# Patient Record
Sex: MALE | Race: WHITE | Employment: OTHER | URBAN - METROPOLITAN AREA
[De-identification: names, ages, dates, MRNs, and addresses within clinical notes are randomized per-mention and may not be internally consistent; named-entity substitution may affect disease eponyms.]

---

## 2018-08-02 ENCOUNTER — OFFICE VISIT (OUTPATIENT)
Dept: SURGERY | Age: 83
End: 2018-08-02

## 2018-08-02 ENCOUNTER — PROCEDURE VISIT (OUTPATIENT)
Dept: SURGERY | Age: 83
End: 2018-08-02

## 2018-08-02 VITALS
WEIGHT: 136 LBS | DIASTOLIC BLOOD PRESSURE: 66 MMHG | BODY MASS INDEX: 18.42 KG/M2 | HEIGHT: 72 IN | SYSTOLIC BLOOD PRESSURE: 138 MMHG

## 2018-08-02 DIAGNOSIS — I65.23 BILATERAL CAROTID ARTERY STENOSIS WITHOUT CEREBRAL INFARCTION: Primary | ICD-10-CM

## 2018-08-02 DIAGNOSIS — I70.212 ATHEROSCLEROSIS OF NATIVE ARTERY OF LEFT LOWER EXTREMITY WITH INTERMITTENT CLAUDICATION (HCC): ICD-10-CM

## 2018-08-02 DIAGNOSIS — E78.5 HYPERLIPIDEMIA, UNSPECIFIED HYPERLIPIDEMIA TYPE: ICD-10-CM

## 2018-08-02 DIAGNOSIS — I10 ESSENTIAL HYPERTENSION, BENIGN: ICD-10-CM

## 2018-08-02 PROCEDURE — 93925 LOWER EXTREMITY STUDY: CPT | Performed by: SURGERY

## 2018-08-02 PROCEDURE — 99214 OFFICE O/P EST MOD 30 MIN: CPT | Performed by: NURSE PRACTITIONER

## 2018-08-02 PROCEDURE — 93880 EXTRACRANIAL BILAT STUDY: CPT | Performed by: SURGERY

## 2018-08-02 NOTE — PROGRESS NOTES
Authorizing Provider   Multiple Vitamins-Minerals (PRESERVISION AREDS 2 PO) Take  by mouth 2 times daily. Yes Historical Provider, MD   ranitidine (ZANTAC) 300 MG tablet   Take 300 mg by mouth nightly  Yes Historical Provider, MD   PLAVIX 75 MG tablet daily. Yes Historical Provider, MD   aspirin 81 MG chewable tablet Take 81 mg by mouth daily. Yes Historical Provider, MD   hydrochlorothiazide (HYDRODIURIL) 25 MG tablet Take 25 mg by mouth daily. Yes Historical Provider, MD   lisinopril (PRINIVIL;ZESTRIL) 10 MG tablet Take 10 mg by mouth daily. Yes Historical Provider, MD   simvastatin (ZOCOR) 10 MG tablet Take 20 mg by mouth nightly. Yes Historical Provider, MD       History reviewed. Objective:   Physical Exam   Constitutional: He appears well-developed and well-nourished. thin   HENT:   Head: Normocephalic. Right Ear: Decreased hearing is noted. Left Ear: Decreased hearing is noted. Neck: Trachea normal. Neck supple. No JVD present. Carotid bruit is not present. No tracheal deviation present. Cardiovascular: Normal rate and normal heart sounds. An irregular rhythm present. Pulses:       Carotid pulses are 2+ on the right side, and 2+ on the left side. Radial pulses are 2+ on the right side, and 2+ on the left side. Femoral pulses are 2+ on the right side, and 2+ on the left side. Popliteal pulses are 2+ on the right side, and 1+ on the left side. Dorsalis pedis pulses are 0 on the right side, and 0 on the left side. Posterior tibial pulses are 2+ on the right side, and 0 on the left side. MONA'S:TODAY  Right:  P.T.: 160 D.P.: 162 ARM BP: 144/70 P. I.: 1.11/1.12  Left:  P.T.:102 D.P.: 100 ARM BP: 138/66 P. I.: 0.71/0.69      Patent right femoral PT in situ bypass graft    Ankle Brachial Index Report from 12/07/2017     Right  Left  Arm   144/70  139/74    Posterior Tibial  158    108  Dorsalis Pedis            144 60  MONA:   1.10/1.0 0.75/0.42     Pulmonary/Chest: No respiratory distress. He has no wheezes. He has no rales. Abdominal: He exhibits no distension and no mass. There is no tenderness. There is no guarding. Musculoskeletal: He exhibits no edema or tenderness. Neurological: He has normal strength. No cranial nerve deficit or sensory deficit. Gait abnormal. Abnormal coordination: use of a cane. Skin: Skin is warm, dry and intact. Vitals reviewed. Assessment:       Diagnosis   1. Bilateral carotid artery stenosis without cerebral infarction      * The patient has a 50-80% DILIP stenosis by velocity criteria; DILIP 70% by image. * The patient has a 88-58% LICA stenosis by velocity criteria. * The patient has not experienced any symptoms related to his carotid disease. * Follow up in 6 months with carotid doppler scan and office visit. 2. 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 Hyperlipidemia, unspecified hyperlipidemia type - stable, on simvastatin   4. Essential hypertension, benign - stable, on lisinopril & HCTZ         Plan:      PATIENT EDUCATION focused on elevating legs with the ankle at or above the level of the heart as needed to relieve leg pain and swelling. Patient to participate in exercise as tolerated with focus on the leg(s) including, daily walking, repetitive toe pointing, and calve muscle pumping/stretching as tolerated. Patient reports that he prefers to exercise, but at this time, he is unable to get to the [facility] without paying $15 transportation. At the end of the month, he is moving to a new facility that will have exercise equipment on-site. PATIENT EDUCATION focused on signs/symptoms of stroke:  - Watch for one eye going dark like a shade was pulled down. - Watch for one side of the body or face not functioning correctly. - Difficulty with speech, such as slurring your words.   - Difficulty finding the right words, such as calling an object by the wrong name when you know the real name. If you have any of these symptoms, do not call us or your family doctor. Call 911 and go immediately to the hospital.  You have a 4-6 hour window from the point when symptoms start to get to the hospital, get a CT scan done and initiate clot dissolving drugs. Return in about 6 months (around 2/2/2019) for ADS with MONA's, CDS and office visit.

## 2018-08-02 NOTE — LETTER
[facility]  [facility] [address]  Phone: [phone]  Fax: [phone]    Levora Gaucher, CNP        August 2, 2018      [address] [place] [address]     Patient: Anay Easley    MR Number: [id number]    YOB: 1930    Date of Visit: 08/02/2018        Dear Veronica Massey:    Anay Easley, Dr. Lukas Aranda patient, was in the office today following his carotid duplex scan. My assessment is as follows:    Carotid artery stenosis, w/o mention of cerebral infarction   Current plans       * The patient has a 50-80% DILIP stenosis by velocity criteria; DILIP 70% by            image. * The patient has a 18-88% LICA stenosis by velocity criteria. * The patient has not experienced any symptoms related to his carotid disease. * Follow up in 6 months with carotid doppler scan and office visit. I will keep you posted regarding his progress.     Sincerely,      Levora Gaucher, CNP

## 2018-08-02 NOTE — PATIENT INSTRUCTIONS
PATIENT EDUCATION focused on signs/symptoms of stroke:  - Watch for one eye going dark like a shade was pulled down. - Watch for one side of the body or face not functioning correctly. - Difficulty with speech, such as slurring your words. - Difficulty finding the right words, such as calling an object by the wrong name when you know the real name. If you have any of these symptoms, do not call us or your family doctor. Call 911 and go immediately to the hospital.  You have a 4-6 hour window from the point when symptoms start to get to the hospital, get a CT scan done and initiate clot dissolving drugs. PATIENT EDUCATION focused on elevating legs with the ankle at or above the level of the heart as needed to relieve leg pain and swelling. Patient to participate in exercise as tolerated with focus on the leg(s) including, daily walking, repetitive toe pointing, and calve muscle pumping/stretching as tolerated. Return in about 6 months (around 2/2/2019) for ADS with MONA's, CDS and office visit.

## 2018-08-03 PROBLEM — E78.5 HYPERLIPIDEMIA: Status: ACTIVE | Noted: 2018-08-03

## 2018-08-31 ENCOUNTER — TELEPHONE (OUTPATIENT)
Dept: SURGERY | Age: 83
End: 2018-08-31

## 2018-08-31 NOTE — TELEPHONE ENCOUNTER
Patient's daughter wanted to let you know that he had been at [facility]. Does he need to be on 2 blood thinners?

## 2018-09-05 ENCOUNTER — TELEPHONE (OUTPATIENT)
Dept: SURGERY | Age: 83
End: 2018-09-05

## 2018-12-22 ENCOUNTER — HOSPITAL ENCOUNTER (EMERGENCY)
Age: 83
Discharge: HOME | End: 2018-12-23
Attending: EMERGENCY MEDICINE
Payer: MEDICARE

## 2018-12-22 VITALS
RESPIRATION RATE: 20 BRPM | WEIGHT: 138.89 LBS | TEMPERATURE: 98.4 F | HEART RATE: 86 BPM | BODY MASS INDEX: 19.44 KG/M2 | DIASTOLIC BLOOD PRESSURE: 101 MMHG | SYSTOLIC BLOOD PRESSURE: 138 MMHG | HEIGHT: 71 IN | OXYGEN SATURATION: 98 %

## 2018-12-22 DIAGNOSIS — K56.41 FECAL IMPACTION IN RECTUM (HCC): Primary | ICD-10-CM

## 2018-12-22 DIAGNOSIS — R33.9 URINARY RETENTION: ICD-10-CM

## 2018-12-22 PROCEDURE — 99283 EMERGENCY DEPT VISIT LOW MDM: CPT

## 2018-12-22 PROCEDURE — 2500000003 HC RX 250 WO HCPCS: Performed by: EMERGENCY MEDICINE

## 2018-12-22 RX ORDER — POLYETHYLENE GLYCOL 3350 17 G/17G
17 POWDER, FOR SOLUTION ORAL DAILY
Qty: 1 BOTTLE | Refills: 0 | Status: SHIPPED | OUTPATIENT
Start: 2018-12-22 | End: 2018-12-27

## 2018-12-22 RX ADMIN — Medication 330 ML: at 22:15

## 2018-12-22 ASSESSMENT — PAIN DESCRIPTION - PAIN TYPE: TYPE: ACUTE PAIN

## 2018-12-22 ASSESSMENT — PAIN DESCRIPTION - LOCATION: LOCATION: PELVIS

## 2018-12-22 ASSESSMENT — PAIN SCALES - GENERAL: PAINLEVEL_OUTOF10: 9

## 2018-12-22 ASSESSMENT — PAIN DESCRIPTION - FREQUENCY: FREQUENCY: INTERMITTENT

## 2018-12-22 ASSESSMENT — PAIN DESCRIPTION - DESCRIPTORS: DESCRIPTORS: BURNING

## 2018-12-23 NOTE — ED NOTES
Bed: B-05  Expected date:   Expected time:   Means of arrival:   Comments:  Jaylin PenaM from [facility]  Constipated     Candance Fredericks, RN  12/22/18 2134

## 2019-01-13 ENCOUNTER — APPOINTMENT (OUTPATIENT)
Dept: CT IMAGING | Age: 84
End: 2019-01-13
Payer: MEDICARE

## 2019-01-13 ENCOUNTER — HOSPITAL ENCOUNTER (EMERGENCY)
Age: 84
Discharge: HOME | End: 2019-01-13
Attending: EMERGENCY MEDICINE
Payer: MEDICARE

## 2019-01-13 VITALS
HEIGHT: 71 IN | WEIGHT: 138 LBS | DIASTOLIC BLOOD PRESSURE: 78 MMHG | OXYGEN SATURATION: 100 % | TEMPERATURE: 98.7 F | HEART RATE: 90 BPM | RESPIRATION RATE: 20 BRPM | SYSTOLIC BLOOD PRESSURE: 140 MMHG | BODY MASS INDEX: 19.32 KG/M2

## 2019-01-13 DIAGNOSIS — N40.1 BENIGN PROSTATIC HYPERPLASIA WITH URINARY RETENTION: ICD-10-CM

## 2019-01-13 DIAGNOSIS — R33.8 BENIGN PROSTATIC HYPERPLASIA WITH URINARY RETENTION: ICD-10-CM

## 2019-01-13 DIAGNOSIS — R33.9 URINARY RETENTION: ICD-10-CM

## 2019-01-13 DIAGNOSIS — K59.00 CONSTIPATION, UNSPECIFIED CONSTIPATION TYPE: Primary | ICD-10-CM

## 2019-01-13 LAB
A/G RATIO: 1.8 (ref 1.1–2.2)
ALBUMIN SERPL-MCNC: 4.2 G/DL (ref 3.4–5)
ALP BLD-CCNC: 72 U/L (ref 40–129)
ALT SERPL-CCNC: 12 U/L (ref 10–40)
ANION GAP SERPL CALCULATED.3IONS-SCNC: 14 MMOL/L (ref 3–16)
AST SERPL-CCNC: 18 U/L (ref 15–37)
BILIRUB SERPL-MCNC: 0.6 MG/DL (ref 0–1)
BUN BLDV-MCNC: 20 MG/DL (ref 7–20)
CALCIUM SERPL-MCNC: 9.6 MG/DL (ref 8.3–10.6)
CHLORIDE BLD-SCNC: 93 MMOL/L (ref 99–110)
CO2: 24 MMOL/L (ref 21–32)
CREAT SERPL-MCNC: 0.7 MG/DL (ref 0.8–1.3)
GFR AFRICAN AMERICAN: >60
GFR NON-AFRICAN AMERICAN: >60
GLOBULIN: 2.4 G/DL
GLUCOSE BLD-MCNC: 88 MG/DL (ref 70–99)
HCT VFR BLD CALC: 41.4 % (ref 40.5–52.5)
HEMOGLOBIN: 14.3 G/DL (ref 13.5–17.5)
MCH RBC QN AUTO: 34.3 PG (ref 26–34)
MCHC RBC AUTO-ENTMCNC: 34.6 G/DL (ref 31–36)
MCV RBC AUTO: 98.9 FL (ref 80–100)
PDW BLD-RTO: 13.7 % (ref 12.4–15.4)
PLATELET # BLD: 268 K/UL (ref 135–450)
PLATELET SLIDE REVIEW: ADEQUATE
PMV BLD AUTO: 9.3 FL (ref 5–10.5)
POTASSIUM SERPL-SCNC: 4.2 MMOL/L (ref 3.5–5.1)
RBC # BLD: 4.18 M/UL (ref 4.2–5.9)
SODIUM BLD-SCNC: 131 MMOL/L (ref 136–145)
TOTAL PROTEIN: 6.6 G/DL (ref 6.4–8.2)
WBC # BLD: 9.7 K/UL (ref 4–11)

## 2019-01-13 PROCEDURE — 85027 COMPLETE CBC AUTOMATED: CPT

## 2019-01-13 PROCEDURE — 80053 COMPREHEN METABOLIC PANEL: CPT

## 2019-01-13 PROCEDURE — 6360000004 HC RX CONTRAST MEDICATION: Performed by: PHYSICIAN ASSISTANT

## 2019-01-13 PROCEDURE — 99284 EMERGENCY DEPT VISIT MOD MDM: CPT

## 2019-01-13 PROCEDURE — 51702 INSERT TEMP BLADDER CATH: CPT

## 2019-01-13 PROCEDURE — 6360000004 HC RX CONTRAST MEDICATION: Performed by: EMERGENCY MEDICINE

## 2019-01-13 PROCEDURE — 74177 CT ABD & PELVIS W/CONTRAST: CPT

## 2019-01-13 RX ADMIN — IOHEXOL 50 ML: 240 INJECTION, SOLUTION INTRATHECAL; INTRAVASCULAR; INTRAVENOUS; ORAL at 06:11

## 2019-01-13 RX ADMIN — IOPAMIDOL 75 ML: 755 INJECTION, SOLUTION INTRAVENOUS at 06:31

## 2019-01-13 ASSESSMENT — ENCOUNTER SYMPTOMS
BACK PAIN: 0
VOMITING: 0
CONSTIPATION: 1
ABDOMINAL PAIN: 0
SHORTNESS OF BREATH: 0

## 2019-01-19 ENCOUNTER — ANESTHESIA EVENT (OUTPATIENT)
Dept: OPERATING ROOM | Age: 84
DRG: 667 | End: 2019-01-19
Payer: MEDICARE

## 2019-01-19 ENCOUNTER — HOSPITAL ENCOUNTER (INPATIENT)
Age: 84
LOS: 2 days | Discharge: HOME HEALTH | DRG: 667 | End: 2019-01-21
Attending: EMERGENCY MEDICINE | Admitting: INTERNAL MEDICINE
Payer: MEDICARE

## 2019-01-19 ENCOUNTER — ANESTHESIA (OUTPATIENT)
Dept: OPERATING ROOM | Age: 84
DRG: 667 | End: 2019-01-19
Payer: MEDICARE

## 2019-01-19 VITALS
RESPIRATION RATE: 1 BRPM | DIASTOLIC BLOOD PRESSURE: 67 MMHG | OXYGEN SATURATION: 100 % | SYSTOLIC BLOOD PRESSURE: 139 MMHG

## 2019-01-19 PROBLEM — H91.93 BILATERAL HEARING LOSS: Status: ACTIVE | Noted: 2019-01-19

## 2019-01-19 PROBLEM — R31.9 HEMATURIA: Status: ACTIVE | Noted: 2019-01-19

## 2019-01-19 PROBLEM — R33.9 URINARY RETENTION: Status: ACTIVE | Noted: 2019-01-19

## 2019-01-19 LAB
ABO/RH: NORMAL
ANION GAP SERPL CALCULATED.3IONS-SCNC: 12 MMOL/L (ref 3–16)
ANTIBODY SCREEN: NORMAL
APTT: 32.5 SEC (ref 26–36)
BUN BLDV-MCNC: 21 MG/DL (ref 7–20)
CALCIUM SERPL-MCNC: 9.5 MG/DL (ref 8.3–10.6)
CHLORIDE BLD-SCNC: 97 MMOL/L (ref 99–110)
CO2: 28 MMOL/L (ref 21–32)
CREAT SERPL-MCNC: 0.8 MG/DL (ref 0.8–1.3)
GFR AFRICAN AMERICAN: >60
GFR NON-AFRICAN AMERICAN: >60
GLUCOSE BLD-MCNC: 108 MG/DL (ref 70–99)
HCT VFR BLD CALC: 38.7 % (ref 40.5–52.5)
HCT VFR BLD CALC: 39.2 % (ref 40.5–52.5)
HEMOGLOBIN: 13.2 G/DL (ref 13.5–17.5)
HEMOGLOBIN: 13.6 G/DL (ref 13.5–17.5)
INR BLD: 0.98 (ref 0.86–1.14)
MCH RBC QN AUTO: 34 PG (ref 26–34)
MCHC RBC AUTO-ENTMCNC: 35 G/DL (ref 31–36)
MCV RBC AUTO: 96.9 FL (ref 80–100)
PDW BLD-RTO: 13.6 % (ref 12.4–15.4)
PLATELET # BLD: 279 K/UL (ref 135–450)
PMV BLD AUTO: 8.8 FL (ref 5–10.5)
POTASSIUM SERPL-SCNC: 4.1 MMOL/L (ref 3.5–5.1)
PROTHROMBIN TIME: 11.2 SEC (ref 9.8–13)
RBC # BLD: 3.99 M/UL (ref 4.2–5.9)
SODIUM BLD-SCNC: 137 MMOL/L (ref 136–145)
WBC # BLD: 8 K/UL (ref 4–11)

## 2019-01-19 PROCEDURE — 2709999900 HC NON-CHARGEABLE SUPPLY: Performed by: UROLOGY

## 2019-01-19 PROCEDURE — 6360000002 HC RX W HCPCS: Performed by: ANESTHESIOLOGY

## 2019-01-19 PROCEDURE — 99284 EMERGENCY DEPT VISIT MOD MDM: CPT

## 2019-01-19 PROCEDURE — 96365 THER/PROPH/DIAG IV INF INIT: CPT

## 2019-01-19 PROCEDURE — G0378 HOSPITAL OBSERVATION PER HR: HCPCS

## 2019-01-19 PROCEDURE — 2580000003 HC RX 258: Performed by: ANESTHESIOLOGY

## 2019-01-19 PROCEDURE — 0TCB8ZZ EXTIRPATION OF MATTER FROM BLADDER, VIA NATURAL OR ARTIFICIAL OPENING ENDOSCOPIC: ICD-10-PCS | Performed by: UROLOGY

## 2019-01-19 PROCEDURE — 80048 BASIC METABOLIC PNL TOTAL CA: CPT

## 2019-01-19 PROCEDURE — 6360000002 HC RX W HCPCS: Performed by: PHYSICIAN ASSISTANT

## 2019-01-19 PROCEDURE — 86850 RBC ANTIBODY SCREEN: CPT

## 2019-01-19 PROCEDURE — 3700000000 HC ANESTHESIA ATTENDED CARE: Performed by: UROLOGY

## 2019-01-19 PROCEDURE — 51702 INSERT TEMP BLADDER CATH: CPT

## 2019-01-19 PROCEDURE — 96375 TX/PRO/DX INJ NEW DRUG ADDON: CPT

## 2019-01-19 PROCEDURE — 51700 IRRIGATION OF BLADDER: CPT

## 2019-01-19 PROCEDURE — 85610 PROTHROMBIN TIME: CPT

## 2019-01-19 PROCEDURE — 7100000000 HC PACU RECOVERY - FIRST 15 MIN: Performed by: UROLOGY

## 2019-01-19 PROCEDURE — 85018 HEMOGLOBIN: CPT

## 2019-01-19 PROCEDURE — 3600000003 HC SURGERY LEVEL 3 BASE: Performed by: UROLOGY

## 2019-01-19 PROCEDURE — 3700000001 HC ADD 15 MINUTES (ANESTHESIA): Performed by: UROLOGY

## 2019-01-19 PROCEDURE — 51798 US URINE CAPACITY MEASURE: CPT

## 2019-01-19 PROCEDURE — 3600000013 HC SURGERY LEVEL 3 ADDTL 15MIN: Performed by: UROLOGY

## 2019-01-19 PROCEDURE — 85027 COMPLETE CBC AUTOMATED: CPT

## 2019-01-19 PROCEDURE — 2580000003 HC RX 258: Performed by: INTERNAL MEDICINE

## 2019-01-19 PROCEDURE — 0V508ZZ DESTRUCTION OF PROSTATE, VIA NATURAL OR ARTIFICIAL OPENING ENDOSCOPIC: ICD-10-PCS | Performed by: UROLOGY

## 2019-01-19 PROCEDURE — 86900 BLOOD TYPING SEROLOGIC ABO: CPT

## 2019-01-19 PROCEDURE — 86901 BLOOD TYPING SEROLOGIC RH(D): CPT

## 2019-01-19 PROCEDURE — 85014 HEMATOCRIT: CPT

## 2019-01-19 PROCEDURE — 2580000003 HC RX 258: Performed by: UROLOGY

## 2019-01-19 PROCEDURE — 7100000001 HC PACU RECOVERY - ADDTL 15 MIN: Performed by: UROLOGY

## 2019-01-19 PROCEDURE — 1200000000 HC SEMI PRIVATE

## 2019-01-19 PROCEDURE — 85730 THROMBOPLASTIN TIME PARTIAL: CPT

## 2019-01-19 PROCEDURE — 93005 ELECTROCARDIOGRAM TRACING: CPT | Performed by: ANESTHESIOLOGY

## 2019-01-19 PROCEDURE — S0028 INJECTION, FAMOTIDINE, 20 MG: HCPCS | Performed by: ANESTHESIOLOGY

## 2019-01-19 PROCEDURE — 6370000000 HC RX 637 (ALT 250 FOR IP): Performed by: INTERNAL MEDICINE

## 2019-01-19 PROCEDURE — 87086 URINE CULTURE/COLONY COUNT: CPT

## 2019-01-19 RX ORDER — DOCUSATE SODIUM 100 MG/1
100 CAPSULE, LIQUID FILLED ORAL 2 TIMES DAILY PRN
Status: DISCONTINUED | OUTPATIENT
Start: 2019-01-19 | End: 2019-01-21

## 2019-01-19 RX ORDER — MORPHINE SULFATE 2 MG/ML
2 INJECTION, SOLUTION INTRAMUSCULAR; INTRAVENOUS ONCE
Status: COMPLETED | OUTPATIENT
Start: 2019-01-19 | End: 2019-01-19

## 2019-01-19 RX ORDER — SODIUM CHLORIDE 0.9 % (FLUSH) 0.9 %
10 SYRINGE (ML) INJECTION EVERY 12 HOURS SCHEDULED
Status: DISCONTINUED | OUTPATIENT
Start: 2019-01-19 | End: 2019-01-21 | Stop reason: HOSPADM

## 2019-01-19 RX ORDER — FAMOTIDINE 20 MG/1
20 TABLET, FILM COATED ORAL 2 TIMES DAILY
Status: DISCONTINUED | OUTPATIENT
Start: 2019-01-19 | End: 2019-01-21 | Stop reason: HOSPADM

## 2019-01-19 RX ORDER — MAGNESIUM HYDROXIDE 1200 MG/15ML
LIQUID ORAL
Status: COMPLETED | OUTPATIENT
Start: 2019-01-19 | End: 2019-01-19

## 2019-01-19 RX ORDER — TAMSULOSIN HYDROCHLORIDE 0.4 MG/1
0.8 CAPSULE ORAL NIGHTLY
COMMUNITY
Start: 2019-01-15

## 2019-01-19 RX ORDER — PROPOFOL 10 MG/ML
INJECTION, EMULSION INTRAVENOUS PRN
Status: DISCONTINUED | OUTPATIENT
Start: 2019-01-19 | End: 2019-01-19

## 2019-01-19 RX ORDER — ONDANSETRON 2 MG/ML
INJECTION INTRAMUSCULAR; INTRAVENOUS PRN
Status: DISCONTINUED | OUTPATIENT
Start: 2019-01-19 | End: 2019-01-19 | Stop reason: SDUPTHER

## 2019-01-19 RX ORDER — LISINOPRIL 10 MG/1
10 TABLET ORAL DAILY
Status: DISCONTINUED | OUTPATIENT
Start: 2019-01-19 | End: 2019-01-21 | Stop reason: HOSPADM

## 2019-01-19 RX ORDER — MORPHINE SULFATE 2 MG/ML
1 INJECTION, SOLUTION INTRAMUSCULAR; INTRAVENOUS EVERY 5 MIN PRN
Status: DISCONTINUED | OUTPATIENT
Start: 2019-01-19 | End: 2019-01-19 | Stop reason: HOSPADM

## 2019-01-19 RX ORDER — MEPERIDINE HYDROCHLORIDE 25 MG/ML
12.5 INJECTION INTRAMUSCULAR; INTRAVENOUS; SUBCUTANEOUS EVERY 5 MIN PRN
Status: DISCONTINUED | OUTPATIENT
Start: 2019-01-19 | End: 2019-01-19 | Stop reason: HOSPADM

## 2019-01-19 RX ORDER — OXYCODONE HYDROCHLORIDE AND ACETAMINOPHEN 5; 325 MG/1; MG/1
2 TABLET ORAL PRN
Status: DISCONTINUED | OUTPATIENT
Start: 2019-01-19 | End: 2019-01-19 | Stop reason: HOSPADM

## 2019-01-19 RX ORDER — FENTANYL CITRATE 50 UG/ML
INJECTION, SOLUTION INTRAMUSCULAR; INTRAVENOUS PRN
Status: DISCONTINUED | OUTPATIENT
Start: 2019-01-19 | End: 2019-01-19 | Stop reason: SDUPTHER

## 2019-01-19 RX ORDER — ONDANSETRON 2 MG/ML
4 INJECTION INTRAMUSCULAR; INTRAVENOUS EVERY 4 HOURS PRN
Status: DISCONTINUED | OUTPATIENT
Start: 2019-01-19 | End: 2019-01-21 | Stop reason: HOSPADM

## 2019-01-19 RX ORDER — GABAPENTIN 100 MG/1
100 CAPSULE ORAL 2 TIMES DAILY
COMMUNITY
Start: 2018-12-26 | End: 2020-02-06 | Stop reason: CLARIF

## 2019-01-19 RX ORDER — CITALOPRAM 10 MG/1
10 TABLET ORAL DAILY
Status: DISCONTINUED | OUTPATIENT
Start: 2019-01-19 | End: 2019-01-21 | Stop reason: HOSPADM

## 2019-01-19 RX ORDER — MAGNESIUM HYDROXIDE 1200 MG/15ML
LIQUID ORAL CONTINUOUS PRN
Status: COMPLETED | OUTPATIENT
Start: 2019-01-19 | End: 2019-01-19

## 2019-01-19 RX ORDER — SODIUM CHLORIDE 0.9 % (FLUSH) 0.9 %
10 SYRINGE (ML) INJECTION PRN
Status: DISCONTINUED | OUTPATIENT
Start: 2019-01-19 | End: 2019-01-21 | Stop reason: HOSPADM

## 2019-01-19 RX ORDER — ONDANSETRON 2 MG/ML
4 INJECTION INTRAMUSCULAR; INTRAVENOUS
Status: DISCONTINUED | OUTPATIENT
Start: 2019-01-19 | End: 2019-01-19 | Stop reason: HOSPADM

## 2019-01-19 RX ORDER — CIPROFLOXACIN 2 MG/ML
400 INJECTION, SOLUTION INTRAVENOUS ONCE
Status: COMPLETED | OUTPATIENT
Start: 2019-01-19 | End: 2019-01-19

## 2019-01-19 RX ORDER — ASPIRIN 81 MG/1
81 TABLET, CHEWABLE ORAL DAILY
Status: DISCONTINUED | OUTPATIENT
Start: 2019-01-19 | End: 2019-01-20

## 2019-01-19 RX ORDER — GABAPENTIN 100 MG/1
100 CAPSULE ORAL 2 TIMES DAILY
Status: DISCONTINUED | OUTPATIENT
Start: 2019-01-19 | End: 2019-01-21 | Stop reason: HOSPADM

## 2019-01-19 RX ORDER — FLUTICASONE PROPIONATE 50 MCG
2 SPRAY, SUSPENSION (ML) NASAL DAILY
COMMUNITY
Start: 2018-08-28

## 2019-01-19 RX ORDER — PROPOFOL 10 MG/ML
INJECTION, EMULSION INTRAVENOUS PRN
Status: DISCONTINUED | OUTPATIENT
Start: 2019-01-19 | End: 2019-01-19 | Stop reason: SDUPTHER

## 2019-01-19 RX ORDER — SODIUM CHLORIDE 9 MG/ML
INJECTION, SOLUTION INTRAVENOUS CONTINUOUS PRN
Status: DISCONTINUED | OUTPATIENT
Start: 2019-01-19 | End: 2019-01-19 | Stop reason: SDUPTHER

## 2019-01-19 RX ORDER — FENTANYL CITRATE 50 UG/ML
50 INJECTION, SOLUTION INTRAMUSCULAR; INTRAVENOUS EVERY 5 MIN PRN
Status: DISCONTINUED | OUTPATIENT
Start: 2019-01-19 | End: 2019-01-19 | Stop reason: HOSPADM

## 2019-01-19 RX ORDER — FLUTICASONE PROPIONATE 50 MCG
2 SPRAY, SUSPENSION (ML) NASAL DAILY
Status: DISCONTINUED | OUTPATIENT
Start: 2019-01-19 | End: 2019-01-21 | Stop reason: HOSPADM

## 2019-01-19 RX ORDER — BISACODYL 10 MG
10 SUPPOSITORY, RECTAL RECTAL DAILY PRN
Status: DISCONTINUED | OUTPATIENT
Start: 2019-01-19 | End: 2019-01-21 | Stop reason: HOSPADM

## 2019-01-19 RX ORDER — DOCUSATE SODIUM 100 MG/1
100 CAPSULE, LIQUID FILLED ORAL 2 TIMES DAILY
COMMUNITY
Start: 2018-12-26 | End: 2019-02-11

## 2019-01-19 RX ORDER — MORPHINE SULFATE 2 MG/ML
2 INJECTION, SOLUTION INTRAMUSCULAR; INTRAVENOUS EVERY 5 MIN PRN
Status: DISCONTINUED | OUTPATIENT
Start: 2019-01-19 | End: 2019-01-19 | Stop reason: HOSPADM

## 2019-01-19 RX ORDER — SIMVASTATIN 20 MG
20 TABLET ORAL NIGHTLY
Status: DISCONTINUED | OUTPATIENT
Start: 2019-01-19 | End: 2019-01-21 | Stop reason: HOSPADM

## 2019-01-19 RX ORDER — CITALOPRAM 10 MG/1
1 TABLET ORAL DAILY
COMMUNITY
Start: 2019-01-01 | End: 2020-02-06 | Stop reason: CLARIF

## 2019-01-19 RX ORDER — FENTANYL CITRATE 50 UG/ML
25 INJECTION, SOLUTION INTRAMUSCULAR; INTRAVENOUS EVERY 5 MIN PRN
Status: DISCONTINUED | OUTPATIENT
Start: 2019-01-19 | End: 2019-01-19 | Stop reason: HOSPADM

## 2019-01-19 RX ORDER — OXYCODONE HYDROCHLORIDE AND ACETAMINOPHEN 5; 325 MG/1; MG/1
1 TABLET ORAL PRN
Status: DISCONTINUED | OUTPATIENT
Start: 2019-01-19 | End: 2019-01-19 | Stop reason: HOSPADM

## 2019-01-19 RX ORDER — POLYETHYLENE GLYCOL 3350 17 G/17G
17 POWDER, FOR SOLUTION ORAL DAILY PRN
COMMUNITY

## 2019-01-19 RX ORDER — TAMSULOSIN HYDROCHLORIDE 0.4 MG/1
0.4 CAPSULE ORAL DAILY
Status: DISCONTINUED | OUTPATIENT
Start: 2019-01-19 | End: 2019-01-21 | Stop reason: HOSPADM

## 2019-01-19 RX ADMIN — PROPOFOL 140 MG: 10 INJECTION, EMULSION INTRAVENOUS at 17:08

## 2019-01-19 RX ADMIN — FENTANYL CITRATE 50 MCG: 50 INJECTION INTRAMUSCULAR; INTRAVENOUS at 17:05

## 2019-01-19 RX ADMIN — MORPHINE SULFATE 2 MG: 2 INJECTION, SOLUTION INTRAMUSCULAR; INTRAVENOUS at 15:01

## 2019-01-19 RX ADMIN — GABAPENTIN 100 MG: 100 CAPSULE ORAL at 20:33

## 2019-01-19 RX ADMIN — CIPROFLOXACIN 400 MG: 2 INJECTION, SOLUTION INTRAVENOUS at 15:42

## 2019-01-19 RX ADMIN — FAMOTIDINE 20 MG: 20 TABLET ORAL at 20:33

## 2019-01-19 RX ADMIN — TAMSULOSIN HYDROCHLORIDE 0.4 MG: 0.4 CAPSULE ORAL at 18:27

## 2019-01-19 RX ADMIN — CITALOPRAM HYDROBROMIDE 10 MG: 10 TABLET ORAL at 18:26

## 2019-01-19 RX ADMIN — Medication 10 ML: at 21:00

## 2019-01-19 RX ADMIN — FENTANYL CITRATE 50 MCG: 50 INJECTION INTRAMUSCULAR; INTRAVENOUS at 17:11

## 2019-01-19 RX ADMIN — SIMVASTATIN 20 MG: 20 TABLET, FILM COATED ORAL at 20:33

## 2019-01-19 RX ADMIN — ONDANSETRON 4 MG: 2 INJECTION INTRAMUSCULAR; INTRAVENOUS at 17:10

## 2019-01-19 RX ADMIN — LISINOPRIL 10 MG: 10 TABLET ORAL at 18:26

## 2019-01-19 RX ADMIN — SODIUM CHLORIDE: 9 INJECTION, SOLUTION INTRAVENOUS at 17:12

## 2019-01-19 RX ADMIN — ASPIRIN 81 MG 81 MG: 81 TABLET ORAL at 18:27

## 2019-01-19 RX ADMIN — FAMOTIDINE 20 MG: 10 INJECTION, SOLUTION INTRAVENOUS at 16:56

## 2019-01-19 ASSESSMENT — PULMONARY FUNCTION TESTS
PIF_VALUE: 18
PIF_VALUE: 1
PIF_VALUE: 25
PIF_VALUE: 0
PIF_VALUE: 2
PIF_VALUE: 0
PIF_VALUE: 22
PIF_VALUE: 2
PIF_VALUE: 1
PIF_VALUE: 0
PIF_VALUE: 0
PIF_VALUE: 1
PIF_VALUE: 22
PIF_VALUE: 5
PIF_VALUE: 26
PIF_VALUE: 0
PIF_VALUE: 6
PIF_VALUE: 0
PIF_VALUE: 6
PIF_VALUE: 1
PIF_VALUE: 4
PIF_VALUE: 2
PIF_VALUE: 21
PIF_VALUE: 24
PIF_VALUE: 0
PIF_VALUE: 0
PIF_VALUE: 1
PIF_VALUE: 60
PIF_VALUE: 34
PIF_VALUE: 0
PIF_VALUE: 2
PIF_VALUE: 26
PIF_VALUE: 12
PIF_VALUE: 23
PIF_VALUE: 0
PIF_VALUE: 0
PIF_VALUE: 3

## 2019-01-19 ASSESSMENT — ENCOUNTER SYMPTOMS
ABDOMINAL PAIN: 0
VOMITING: 0
NAUSEA: 0
SHORTNESS OF BREATH: 0

## 2019-01-19 ASSESSMENT — LIFESTYLE VARIABLES: SMOKING_STATUS: 0

## 2019-01-19 ASSESSMENT — PAIN SCALES - GENERAL
PAINLEVEL_OUTOF10: 2
PAINLEVEL_OUTOF10: 0
PAINLEVEL_OUTOF10: 0

## 2019-01-19 NOTE — ED NOTES
Kelby Black RN updated that pt will go straight to surgery from ED.       Rey Dance, RN  01/19/19 4483

## 2019-01-19 NOTE — ED NOTES
Pt report called to Kelle Manjarrez RN. RN states no further questions. Surgery tech to transport pt to OR.       Eva , ELLA  01/19/19 4324

## 2019-01-19 NOTE — PROGRESS NOTES
Pharmacy Medication Reconciliation Note     List of medications patient is currently taking is complete. Source of information:   1. Conversation with patient and family at bedside. 2. EPIC records. ALLERGY  Penicillins    Notes regarding home medications:   1. Patient reports last taking his regular medications yesterday 1/18/19 prior to arrival to ED.     Katie Sue, Pharmacy Intern  1/19/2019  2:58 PM

## 2019-01-19 NOTE — ED PROVIDER NOTES
I independently evaluated and obtained a history and physical on [facility]. All diagnostic, treatment, and disposition assistants were made to myself in conjunction the advanced practice provider. For further details of this patient's emergency department encounter, please see the advanced practice provider's documentation. History: This patient had a Daniel catheter placed 6 days ago secondary to bladder outlet obstruction. He comes in today because she's having a lot of leaking of fluid around the Daniel but the Daniel itself is not draining. Physician Exam: Pleasant male in no acute distress mild suprapubic tenderness. Afebrile. Bedside ultrasound was performed using a butterfly probe. The patient has a large amount of echogenic material in the bladder concerning for clot, and urine cephalad to the debris. I believe in the context of this patient that is clot.         Brian Vieira MD  01/19/19 6076

## 2019-01-19 NOTE — ED NOTES
PA to room to update pt and pt daughter at bedside. Pt requesting pain medication.       Flavia Reyes, RN  01/19/19 2097

## 2019-01-19 NOTE — ED NOTES
RN to room. RN d/c current urinary wilcox. 16F Wilcox placed with assistance of Gaviota Mcneill RN. Pt tolerated well. RN instructed to irrigate wilcox. RN instilled aprox 500mL normal saline with bloody/clot return. Pt uncomfortable but tolerated as wel. PA notified and instructed to place 3-way with irrigation.       Maria De Jesus Weber RN  01/19/19 3962

## 2019-01-19 NOTE — BRIEF OP NOTE
Brief Postoperative Note  ______________________________________________________________    Patient: Travis Hay  YOB: 1930  MRN: [id number]  Date of Procedure: 1/19/2019    Pre-Op Diagnosis: hematuria    Post-Op Diagnosis: Same       Procedure(s):  CYSTOSCOPY EVACUATION OF CLOTS, fulgeration of prostate    Anesthesia: General    Surgeon(s):   Jono Gerard DO    Assistant: none  Estimated Blood Loss (mL): 50    Complications: None    Specimens:   * No specimens in log *    Implants:  * No implants in log *      Drains:   Urethral Catheter 22 fr (Active)       [REMOVED] Urethral Catheter Coude 18 fr (Removed)   Catheter Indications Urology/Urologist seeing this patient or inserted indwelling catheter 1/13/2019  9:58 AM       Findings: small amount of clot, bleeding from enlarged prostate    Michelle Hart DO  Date: 1/19/2019  Time: 5:27 PM

## 2019-01-19 NOTE — ED PROVIDER NOTES
[facility]  eMERGENCY dEPARTMENT eNCOUnter      Pt Name: Raheel Briggs  MRN: [id number]  [place] 8/31/1930  Date of evaluation: 1/19/2019  Provider: Connie Bueno Dr       Chief Complaint   Patient presents with    Urinary Catheter Problem     leaking around catheter         HISTORY OF PRESENT ILLNESS  (Location/Symptom, Timing/Onset, Context/Setting, Quality, Duration,Modifying Factors, Severity.)   Raheel Briggs is a 80 y.o. male who presents to the emergencydepartment Or urine leaking around Wilcox catheter. Patient reports that it started this morning. Also leaked around catheter overnight. Was placed 6 days ago here in the ER. He denies any nausea vomiting abdominal pain. No history of fever. The daughter reports he was in [facility] ER for constipation yesterday. Nursing Notes were reviewed and I agree. REVIEW OF SYSTEMS    (2-9 systems for level 4, 10 or more for level 5)     Review of Systems   Constitutional: Negative for fever. Gastrointestinal: Negative for abdominal pain, nausea and vomiting. Genitourinary:        +leaking around wilcox     Except as noted above the remainder of the review of systems was reviewed and negative.        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 CITALOPRAM (CELEXA) 10 MG TABLET    Take 1 tablet by mouth daily    DOCUSATE SODIUM (COLACE) 100 MG CAPSULE    Take 100 mg by mouth 2 times daily    FLUTICASONE (FLONASE) 50 MCG/ACT NASAL SPRAY    2 sprays by Nasal route daily    GABAPENTIN (NEURONTIN) 100 MG CAPSULE    Take 100 mg by mouth 2 times daily. [place] LISINOPRIL (PRINIVIL;ZESTRIL) 10 MG TABLET    Take 10 mg by mouth daily. MULTIPLE VITAMINS-MINERALS (PRESERVISION AREDS 2 PO)    Take  by mouth 2 times daily. POLYETHYLENE GLYCOL (GLYCOLAX) POWDER    Take 17 g by mouth daily    RANITIDINE (ZANTAC) 300 MG TABLET      Take 300 mg by mouth nightly     SIMVASTATIN (ZOCOR) 20 MG TABLET    Take 20 mg by mouth nightly. TAMSULOSIN (FLOMAX) 0.4 MG CAPSULE    Take 0.4 mg by mouth daily       ALLERGIES     Penicillins    FAMILY HISTORY     History reviewed. No pertinent family history. No family status information on file. SOCIAL HISTORY      reports that he quit smoking about 56 years ago. He has never used smokeless tobacco. He reports that he does not use drugs. PHYSICAL EXAM    (up to 7 for level 4, 8 or more for level 5)     ED Triage Vitals [01/19/19 1212]   BP Temp Temp Source Pulse Resp SpO2 Height Weight   (!) 145/54 97.7 °F (36.5 °C) Oral 86 16 100 % 5' 11\" (1.803 m) 137 lb 9.1 oz (62.4 kg)       Physical Exam   Constitutional: He is oriented to person, place, and time. He appears well-developed and well-nourished. No distress. HENT:   Head: Normocephalic and atraumatic. Right Ear: External ear normal.   Left Ear: External ear normal.   Nose: Nose normal.   Eyes: EOM are normal.   Neck: Normal range of motion. Neck supple. Pulmonary/Chest: Effort normal. No respiratory distress.    Abdominal: Soft. He exhibits no distension and no mass. There is no tenderness. There is no rebound and no guarding. No hernia. Genitourinary:   Genitourinary Comments: +wilcox bag with cloudy urine    Musculoskeletal: Normal range of motion. Neurological: He is alert and oriented to person, place, and time. Skin: Skin is warm and dry. He is not diaphoretic. Psychiatric: He has a normal mood and affect.  His behavior is normal. Judgment and thought content normal.       DIFFERENTIAL DIAGNOSIS   Cystitis, wilcox obstruction, clot retention    DIAGNOSTICRESULTS         RADIOLOGY:   Non-plain film images such as CT, Ultrasound and MRI are read by the radiologist. Plain radiographic images are visualized and preliminarily interpreted by MERARI Bauer with the below findings:      Interpretation per the Radiologist below, if available at the time of this note:    No orders to display         LABS:  Results for orders placed or performed during the hospital encounter of 05/97/62   Basic Metabolic Panel   Result Value Ref Range    Sodium 137 136 - 145 mmol/L    Potassium 4.1 3.5 - 5.1 mmol/L    Chloride 97 (L) 99 - 110 mmol/L    CO2 28 21 - 32 mmol/L    Anion Gap 12 3 - 16    Glucose 108 (H) 70 - 99 mg/dL    BUN 21 (H) 7 - 20 mg/dL    CREATININE 0.8 0.8 - 1.3 mg/dL    GFR Non-African American >60 >60    GFR African American >60 >60    Calcium 9.5 8.3 - 10.6 mg/dL   CBC   Result Value Ref Range    WBC 8.0 4.0 - 11.0 K/uL    RBC 3.99 (L) 4.20 - 5.90 M/uL    Hemoglobin 13.6 13.5 - 17.5 g/dL    Hematocrit 38.7 (L) 40.5 - 52.5 %    MCV 96.9 80.0 - 100.0 fL    MCH 34.0 26.0 - 34.0 pg    MCHC 35.0 31.0 - 36.0 g/dL    RDW 13.6 12.4 - 15.4 %    Platelets 387 875 - 353 K/uL    MPV 8.8 5.0 - 10.5 fL   Protime-INR   Result Value Ref Range    Protime 11.2 9.8 - 13.0 sec    INR 0.98 0.86 - 1.14   APTT   Result Value Ref Range    aPTT 32.5 26.0 - 36.0 sec       All other labs were within normal range or not returned as of this dictation. EMERGENCY DEPARTMENT COURSE and DIFFERENTIALDIAGNOSIS/MDM:   Vitals:    Vitals:    01/19/19 1212 01/19/19 1501   BP: (!) 145/54 (!) 216/77   Pulse: 86 79   Resp: 16 13   Temp: 97.7 °F (36.5 °C)    TempSrc: Oral    SpO2: 100%    Weight: 137 lb 9.1 oz (62.4 kg)    Height: 5' 11\" (1.803 m)        Patient was seen and examined by myself and Dr. Charo Tang. Patient was nontoxic, well appearing, afebrile with normal vital signs. Has no abdominal pain. Urine does appear very cloudy in the bag likely infected. Daniel was changed and bloody urine returned from the Daniel. Also clots. Became obstructed with clots. Dr. Charo Tang did perform a bedside ultrasound and there is a large amount clots in the bladder along with a large amount of urine. Suspect we are not be able to remove the clots through the Daniel as it already blocked. I consulted urology and spoke with Dr. Neema Espinosa who will do cystoscopy with clot evacuation today. Only anticoagulant is aspirin 81. Labs are drawn. Given morphine as he is now uncomfortable. Will give rocephin for UTI. I consulted medicine for admission and dr. Adi Singh accepted admission. He is in stable condition. CONSULTS:  IP CONSULT TO UROLOGY    PROCEDURES:  None    FINAL IMPRESSION      1. Clot retention of urine    2.  Acute cystitis with hematuria        DISPOSITION/PLAN   DISPOSITION Admitted 01/19/2019 03:11:19 PM      PATIENT REFERRED TO:  Alexa Bowden  [address] [place] [address] [id number]  [facility] [address]  [phone]            DISCHARGE MEDICATIONS:  [unfilled]    (Please note that portions ofthis note were completed with a voice recognition program.  Efforts were made to edit the dictations but occasionally words are mis-transcribed.)    Darlene Preciado, [address] [place] [address]  01/19/19 2278

## 2019-01-19 NOTE — ED NOTES
jpt report called to [facility] RN to assume care of pt. RN states no further questions. Supervisor states to take pt to floor at this time.       Dulce Maria Ansari RN  01/19/19 2961

## 2019-01-19 NOTE — ED NOTES
16F 3-way wilcox paced per PA instructions. Pt tolerated well. RN hooked pt up to 3-way but no flow was noted. RN tried second attempt to irrigate wilcox. aprox 300mL instilled with little return. PA and MD to room to do bedside ultra sound. Wilcox placement confirmed and noted several clots per MD. PA to call urology.       Arlin Ng RN  01/19/19 8559

## 2019-01-19 NOTE — CONSULTS
Patient with BPH/retention s/p Daniel 6 days ago. Now back to ED today with clot retention. Unable to irrigate with large bore Daniel and bedside USD shows large clots filling bladder . Patient and family counseled for cysto clot evac. He has been on aspirin .  Full note dictated

## 2019-01-20 LAB
ANION GAP SERPL CALCULATED.3IONS-SCNC: 9 MMOL/L (ref 3–16)
BASOPHILS ABSOLUTE: 0.1 K/UL (ref 0–0.2)
BASOPHILS RELATIVE PERCENT: 0.8 %
BILIRUBIN URINE: NEGATIVE
BLOOD, URINE: ABNORMAL
BUN BLDV-MCNC: 15 MG/DL (ref 7–20)
CALCIUM SERPL-MCNC: 8.9 MG/DL (ref 8.3–10.6)
CHLORIDE BLD-SCNC: 100 MMOL/L (ref 99–110)
CLARITY: CLEAR
CO2: 27 MMOL/L (ref 21–32)
COLOR: YELLOW
CREAT SERPL-MCNC: 0.9 MG/DL (ref 0.8–1.3)
EOSINOPHILS ABSOLUTE: 0.3 K/UL (ref 0–0.6)
EOSINOPHILS RELATIVE PERCENT: 2.9 %
EPITHELIAL CELLS, UA: 1 /HPF (ref 0–5)
GFR AFRICAN AMERICAN: >60
GFR NON-AFRICAN AMERICAN: >60
GLUCOSE BLD-MCNC: 96 MG/DL (ref 70–99)
GLUCOSE URINE: NEGATIVE MG/DL
HCT VFR BLD CALC: 31 % (ref 40.5–52.5)
HCT VFR BLD CALC: 35.2 % (ref 40.5–52.5)
HCT VFR BLD CALC: 35.5 % (ref 40.5–52.5)
HEMOGLOBIN: 10.7 G/DL (ref 13.5–17.5)
HEMOGLOBIN: 12.1 G/DL (ref 13.5–17.5)
HEMOGLOBIN: 12.2 G/DL (ref 13.5–17.5)
HYALINE CASTS: 1 /LPF (ref 0–8)
KETONES, URINE: NEGATIVE MG/DL
LEUKOCYTE ESTERASE, URINE: ABNORMAL
LYMPHOCYTES ABSOLUTE: 0.6 K/UL (ref 1–5.1)
LYMPHOCYTES RELATIVE PERCENT: 6.4 %
MCH RBC QN AUTO: 34 PG (ref 26–34)
MCHC RBC AUTO-ENTMCNC: 34.5 G/DL (ref 31–36)
MCV RBC AUTO: 98.4 FL (ref 80–100)
MICROSCOPIC EXAMINATION: YES
MONOCYTES ABSOLUTE: 1 K/UL (ref 0–1.3)
MONOCYTES RELATIVE PERCENT: 11.5 %
NEUTROPHILS ABSOLUTE: 7 K/UL (ref 1.7–7.7)
NEUTROPHILS RELATIVE PERCENT: 78.4 %
NITRITE, URINE: NEGATIVE
PDW BLD-RTO: 13.8 % (ref 12.4–15.4)
PH UA: 5.5
PLATELET # BLD: 257 K/UL (ref 135–450)
PMV BLD AUTO: 8.8 FL (ref 5–10.5)
POTASSIUM REFLEX MAGNESIUM: 4.3 MMOL/L (ref 3.5–5.1)
PROTEIN UA: NEGATIVE MG/DL
RBC # BLD: 3.58 M/UL (ref 4.2–5.9)
RBC UA: 55 /HPF (ref 0–4)
SODIUM BLD-SCNC: 136 MMOL/L (ref 136–145)
SPECIFIC GRAVITY UA: 1
URINE REFLEX TO CULTURE: YES
URINE TYPE: ABNORMAL
UROBILINOGEN, URINE: 0.2 E.U./DL
WBC # BLD: 8.9 K/UL (ref 4–11)
WBC UA: 5 /HPF (ref 0–5)

## 2019-01-20 PROCEDURE — 2580000003 HC RX 258: Performed by: INTERNAL MEDICINE

## 2019-01-20 PROCEDURE — 36415 COLL VENOUS BLD VENIPUNCTURE: CPT

## 2019-01-20 PROCEDURE — G0378 HOSPITAL OBSERVATION PER HR: HCPCS

## 2019-01-20 PROCEDURE — 1200000000 HC SEMI PRIVATE

## 2019-01-20 PROCEDURE — 93010 ELECTROCARDIOGRAM REPORT: CPT | Performed by: INTERNAL MEDICINE

## 2019-01-20 PROCEDURE — 80048 BASIC METABOLIC PNL TOTAL CA: CPT

## 2019-01-20 PROCEDURE — 81001 URINALYSIS AUTO W/SCOPE: CPT

## 2019-01-20 PROCEDURE — 85018 HEMOGLOBIN: CPT

## 2019-01-20 PROCEDURE — 6370000000 HC RX 637 (ALT 250 FOR IP): Performed by: INTERNAL MEDICINE

## 2019-01-20 PROCEDURE — 85025 COMPLETE CBC W/AUTO DIFF WBC: CPT

## 2019-01-20 PROCEDURE — 85014 HEMATOCRIT: CPT

## 2019-01-20 RX ORDER — DIMETHICONE, OXYBENZONE, AND PADIMATE O 2; 2.5; 6.6 G/100G; G/100G; G/100G
STICK TOPICAL PRN
Status: DISCONTINUED | OUTPATIENT
Start: 2019-01-20 | End: 2019-01-21 | Stop reason: HOSPADM

## 2019-01-20 RX ORDER — POLYVINYL ALCOHOL 14 MG/ML
1 SOLUTION/ DROPS OPHTHALMIC EVERY 4 HOURS PRN
Status: DISCONTINUED | OUTPATIENT
Start: 2019-01-20 | End: 2019-01-21 | Stop reason: HOSPADM

## 2019-01-20 RX ADMIN — SIMVASTATIN 20 MG: 20 TABLET, FILM COATED ORAL at 20:59

## 2019-01-20 RX ADMIN — ASPIRIN 81 MG 81 MG: 81 TABLET ORAL at 08:58

## 2019-01-20 RX ADMIN — FAMOTIDINE 20 MG: 20 TABLET ORAL at 21:00

## 2019-01-20 RX ADMIN — CITALOPRAM HYDROBROMIDE 10 MG: 10 TABLET ORAL at 08:58

## 2019-01-20 RX ADMIN — GABAPENTIN 100 MG: 100 CAPSULE ORAL at 08:58

## 2019-01-20 RX ADMIN — GABAPENTIN 100 MG: 100 CAPSULE ORAL at 21:00

## 2019-01-20 RX ADMIN — FLUTICASONE PROPIONATE 2 SPRAY: 50 SPRAY, METERED NASAL at 08:57

## 2019-01-20 RX ADMIN — Medication 10 ML: at 20:59

## 2019-01-20 RX ADMIN — FAMOTIDINE 20 MG: 20 TABLET ORAL at 08:58

## 2019-01-20 RX ADMIN — Medication 10 ML: at 08:59

## 2019-01-20 RX ADMIN — TAMSULOSIN HYDROCHLORIDE 0.4 MG: 0.4 CAPSULE ORAL at 08:58

## 2019-01-20 RX ADMIN — LISINOPRIL 10 MG: 10 TABLET ORAL at 08:58

## 2019-01-20 ASSESSMENT — PAIN SCALES - GENERAL: PAINLEVEL_OUTOF10: 0

## 2019-01-20 NOTE — H&P
Hospital Medicine  History and Physical    PCP: Carlos Colon  Patient Name: Immanuel Renteria    Date of Service: Pt seen/examined on 01/19/2019 and Admitted to Inpatient with expected LOS greater than two midnights due to medical therapy    CHIEF COMPLAINT:  Pt c/o malfunctioning wilcox catheter   HISTORY OF PRESENT ILLNESS: Pt is an 80y.o. year-old male with a history of Hypertension, Hyperlipidemia and recently diagnosed urinary retention for which a wilcox catheter was placed 6 days ago. He presents to the ER for evaluation after his wilcox catheter stopped draining urine this morning and he began to leak urine around the catheter. The ER changed his Wilcox catheter and gross hematuria returned as well as clots and the catheter became non functional again. Urology (Dr. Elvira Marques) saw the patient emergently and took him to the OR and performed a cystoscopy with evacuation of clots and fulgeration of prostate. He is being admitted for further evaluation and treatment. Associated signs and symptoms do not include fever or chills, nausea, vomiting, hemoptysis, hematochezia, diarrhea or constipation.        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 year-old  male who is alert, very hard of hearing, appears stated age and is cooperative  HEENT: Head: Normocephalic, no lesions, without obvious abnormality. Eye: Normal external eye, conjunctiva, lids cornea, MEJIA. Ears: Normal external ears. Non-tender. Nose: Normal external nose, mucus membranes and septum. Pharynx: Dental Hygiene adequate. Normal buccal mucosa. Normal pharynx. Neck: no adenopathy, no carotid bruit, no JVD, supple, symmetrical, trachea midline and thyroid not enlarged, symmetric, no tenderness/mass/nodules  Lungs: clear to auscultation bilaterally and no use of accessory muscles. Heart: regular rate and rhythm, S1, S2 normal, no murmur, click, rub or gallop and normal apical impulse  Abdomen: soft, non-tender; bowel sounds normal; no masses,  no organomegaly  Extremities: extremities atraumatic, no cyanosis or edema and Homans sign is negative, no sign of DVT. Capillary Refill: Acceptable < 3 seconds  Peripheral Pulses: +3 easily felt, not easily obliterated with pressures  Skin: Skin color, texture, turgor normal. No rashes or lesions on exposed skin  Neurologic: Neurovascularly intact without any focal sensory/motor deficits. Cranial nerves: II-XII intact, grossly non-focal. Gait was not tested. Psychiatric: Alert and oriented, thought content appropriate, normal insight    CBC:   Recent Labs      01/19/19   1446  01/19/19   1943   WBC  8.0   --    HGB  13.6  13.2*   PLT  279   --      BMP:    Recent Labs      01/19/19   1447   NA  137   K  4.1   CL  97*   CO2  28   BUN  21*   CREATININE  0.8   GLUCOSE  108*     Troponin: No results for input(s): TROPONINI in the last 72 hours.   PT/INR:  No results found for: PTINR  U/A:  No results found for: LEUKOCYTESUR, NITRITE, RBCUA, SPECGRAV, UROBILINOGEN, BILIRUBINUR, BLOODU, GLUCOSEU, [address] [facility]      RAD:   I have independently reviewed and interpreted the imaging studies below and based my recommendations to the patient on those findings. Ct Abdomen Pelvis W Iv Contrast Additional Contrast? Oral    Result Date: 1/13/2019  EXAMINATION: CT OF THE ABDOMEN AND PELVIS WITH CONTRAST 1/13/2019 6:31 am TECHNIQUE: CT of the abdomen and pelvis was performed with the administration of intravenous contrast. Multiplanar reformatted images are provided for review. Dose modulation, iterative reconstruction, and/or weight based adjustment of the mA/kV was utilized to reduce the radiation dose to as low as reasonably achievable. COMPARISON: None. HISTORY: ORDERING SYSTEM PROVIDED HISTORY: abd pain TECHNOLOGIST PROVIDED HISTORY: Additional Contrast?->Oral Ordering Physician Provided Reason for Exam: constipation Acuity: Acute Type of Exam: Initial FINDINGS: Lower Chest:  The lung bases are clear. The base of the heart is normal. Organs: Calcified granulomata in the liver and spleen. Gallbladder, biliary ducts and pancreas normal.  The adrenal glands are normal.  Mild bilateral hydronephrosis. No stones identified with otherwise normal appearance of the kidneys. Cortical scarring is seen at the upper pole of the right kidney. GI/Bowel: The stomach, duodenum and small bowel are normal. Nonvisualized appendix with no inflammation at the cecum. Moderate stool throughout the colon suggests constipation. No mucosal abnormalities. There is moderate mucosal thickening at the rectum. Pelvis: The bladder is distended. Slight trabecular pattern of mucosa suggests chronic bladder outlet obstruction. The prostate is severely enlarged measuring approximately 7.7 x 6.8 x 6.6 cm. Mass effect upon the base of the bladder contributing to above findings and mild bilateral hydroureteronephrosis. Peritoneum/Retroperitoneum: Moderate atherosclerotic plaque and calcification throughout the aorta. No aneurysm. No lymphadenopathy. Bones/Soft Tissues: Posterior fusion at L3-5.   No acute skeletal findings. 1. Moderate stool throughout the colon suggesting constipation. Mucosal thickening at the rectum may indicate proctitis. 2. Enlarged prostate with mass effect upon the base of the bladder. There is mild bilateral hydroureteronephrosis as result of chronic bladder outlet obstruction. Assessment:   Principal Problem:    Urinary retention  Active Problems:    Essential hypertension, benign    Hyperlipidemia    Hematuria    Bilateral hearing loss  Resolved Problems:    * No resolved hospital problems. *      Plan:       Urinary retention -   - A wilcox catheter was initially placed 6 days ago  - The wilcox catheter became blocked this morning by blood clots  - The ER changed his Wilcox catheter but it quickly became non functional again due to blood clots  - Urology (Dr. Crystal Lemon) saw the patient emergently and took him to the OR and performed a cystoscopy with evacuation of clots and fulgeration of prostate  - Monitor for continued function of catheter    Hematuria - found to be secondary to bleeding from enlarged prostate. S/p fulgeration of prostate 01/19/2019 by Urology     Diabetes mellitus II - Lantus, SSI and CCD    Essential (primary) hypertension - continue home meds and monitor blood pressure    Hyperlipidemia - No current evidence of Rhabdomyolysis or other adverse effects. Continue statin therapy while in the hospital    Bilateral hearing loss - difficult to communicate with        DVT Prophylaxis: SCDs  Diet: DIET GENERAL;  Code Status: Full Code  (Advanced care planning has been discussed with patient and/or responsible family member and is reflected in the code status. Further orders associated with this have been entered if appropriate)    Disposition: Anticipate that patient will remain in the hospital for 2 to 3 days depending on further evaluation and clinical course.      Nessa Reed MD

## 2019-01-20 NOTE — PLAN OF CARE
Problem: Falls - Risk of:  Goal: Will remain free from falls  Will remain free from falls   Outcome: Ongoing  Pt will remain free from falls. Fall risk assessment completed. Fall precautions in place. Bed in lowest position, wheels locked, bed/chair exit alarm in place, call light within reach, and non skid footwear on. Walkway free of clutter. Pt alert and oriented and able to make needs known. Pt educated to use call light when needing to get up, and pt utilizes call light to make needs known. Will continue to monitor. Goal: Absence of physical injury  Absence of physical injury   Outcome: Ongoing  Pt will remain absent from physical injury during this shift     Problem: Risk for Impaired Skin Integrity  Goal: Tissue integrity - skin and mucous membranes  Structural intactness and normal physiological function of skin and  mucous membranes. Outcome: Ongoing  Skin being assessed during this shift. Julio C protocol in place. Feet being elevated. Encouraging pt to turn every 2 hours. No signs of new skin breakdown. Will continue to monitor and reassess.

## 2019-01-20 NOTE — PROGRESS NOTES
[facility] Medicine Progress Note      Admit Date: 1/19/2019         Overnight Events: no    CC: F/U for malfunctioning wilcox catheter    HPI: The patient is an 80year old,  male, who was recently diagnosed with Urinary Retention. A wilcox catheter was placed 6 days prior to admission. The patient presented to [facility] ER after his catheter stopped draining the date of admission, and urine was noted to be leaking around the catheter. In the ER, the patient's catheter was changed and he was noted to have gross hematuria and clots. The catheter again became non-functional. Urology (Dr. Giovana Puga) saw the patient and emergently took him to the OR for cystoscopy with evacuation of clots and fulguration of prostate. The patient was admitted for further workup and treatment. A three-way wilcox was inserted for CBI. The patient's urine became clear and CBI was discontinued. Interval History/Subjective: No new complaints, aside from the fact that the patient's breakfast was cold. Review of Systems:     Comprehensive ROS negative except as mentioned below.     _____________________________________________________________________  General ROS:  []  fevers  []  chills  []  fatigue  []  weakness  []  night sweats  []  body aches [] Other:  _____________________________________________________________________  HEENT ROS:  []  trauma  []  headache  []  visual changes  []  double vision  []  blurred vision  []  tinnitus  []  vertigo  []  ear ache  []  drainage  []  bleeding gums  []  hoarseness  []  voice change  []  difficult/painful swallowing  []  stuffiness  []  rhinorrhea  []  sneezing  []  epistaxis [] Other:  _____________________________________________________________________  Respiratory ROS:  []  cough  []  SOB  []  wheezing  []  changes in sputum production or quality  []  hemoptysis  []  pleurisy  []  snoring [] Other:  _____________________________________________________________________  Cardiovascular ROS:  [] palpitations  []  pain  []  FOFANA  []  orthopnea  []  syncope  []  lower extremity edema [] Other:  _____________________________________________________________________  Gastrointestinal ROS:  []  Dysphagia  [] ABD pain  []  nausea  []  vomiting  []  indigestion  []  diarrhea  []  constipation [] Other:  _____________________________________________________________________  Genitourinary:  []  frequency  []  polyuria  []  nocturia  []  hesitancy  []  urgency  []  hematuria  []  incontinence [] Other:  _____________________________________________________________________  Musculoskeletal ROS:  []  muscle or joint pain  []  stiffness  [] arthritis  []  gout  []  weakness  []  redness  []  swelling  []  instability [] Other:  _____________________________________________________________________  Endocrine ROS:  []  heat/cold intolerance  []  sweating  []  excessive thirst or hunger [] Other:  _____________________________________________________________________  Neurological ROS:  []  Seizures  []  numbness  []  tingling  []  fainting  []  burning  []  tremors [] Other:  _____________________________________________________________________  Psych ROS:  []  Anxiety  []  depression  []  abnormal thoughts [] Other:  _____________________________________________________________________  Dermatological ROS:  []  Rash  []  sores  []  lumps  []  skin changes  []  changes to hair or nails [] Other:  _____________________________________________________________________    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     Benign essential HTN     BP (high blood pressure)     Cancer of skin, squamous cell     Cardiac ischemia     Hyperlipidemia     Leg pain     Lower extremity edema     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     Unspecified hearing loss     Unspecified pulmonary tuberculosis, confirmation unspecified        Past Surgical History:        Procedure Laterality Date    ANGIOPLASTY      STENT/PTLA    BACK SURGERY  May 2015    lumbar fusion    CYSTOSCOPY  01/19/2019    CYSTOSCOPY EVACUATION OF CLOTS, fulgeration of prostate    HERNIA REPAIR      IR FEMORAL POPLITEAL BYPASS GRAFT Right 8/3/10    Right above knee popliteal to posterior tibial artery bypass graft with non reversed translocated great saphenous vein    TUMOR REMOVAL      chest area       Allergies:  Penicillins    Past medical and surgical history reviewed. Any changes have been noted. Scheduled and prn Medications:    Scheduled Meds:   tamsulosin  0.4 mg Oral Daily    simvastatin  20 mg Oral Nightly    lisinopril  10 mg Oral Daily    gabapentin  100 mg Oral BID    fluticasone  2 spray Nasal Daily    citalopram  10 mg Oral Daily    aspirin  81 mg Oral Daily    sodium chloride flush  10 mL Intravenous 2 times per day    famotidine  20 mg Oral BID     Continuous Infusions:  PRN Meds:.polyvinyl alcohol, sodium chloride flush, magnesium hydroxide, docusate sodium, bisacodyl, ondansetron    PHYSICAL EXAM:  BP (!) 181/79   Pulse 73   Temp 98.3 °F (36.8 °C) (Oral)   Resp 16   Ht 5' 11\" (1.803 m)   Wt 137 lb 9.1 oz (62.4 kg)   SpO2 97%   BMI 19.19 kg/m²       Intake/Output Summary (Last 24 hours) at 01/20/19 1102  Last data filed at 01/20/19 1041   Gross per 24 hour   Intake             1490 ml   Output            43582 ml   Net           -14901 ml       General: Alert and oriented. Resting in bed in no apparent distress. Thin. [place]. Pleasant and cooperative. HEENT: Normocephalic. Atraumatic. Pupils equal and reactive. EOM intact. Oral mucosa pink/moist/intact. Neck: Supple. Symmetrical. Trachea midline. Lungs: Clear to auscultation bilaterally. Respirations even and unlabored. Chest: Exam unremarkable. Cardiac: S1/S2 noted. Regular Rhythm and rate. Abdomen/GI: Soft. Non-tender. Non-distended. BS+. : Daniel in place. Urine is clear. Extremities: PP+. Atraumatic. No redness/cyanosis/edema noted. Brisk cap refill. Skin: Dry and intact. No lesions noted. Neuro: Grossly intact. No focal deficits noted. LABS:    Lab Results   Component Value Date    WBC 8.9 01/20/2019    HGB 12.2 (L) 01/20/2019    HCT 35.2 (L) 01/20/2019    MCV 98.4 01/20/2019     01/20/2019    LYMPHOPCT 6.4 01/20/2019    RBC 3.58 (L) 01/20/2019    MCH 34.0 01/20/2019    MCHC 34.5 01/20/2019    RDW 13.8 01/20/2019       Lab Results   Component Value Date    CREATININE 0.9 01/20/2019    BUN 15 01/20/2019     01/20/2019    K 4.3 01/20/2019     01/20/2019    CO2 27 01/20/2019       No results found for: MG    Lab Results   Component Value Date    ALT 12 01/13/2019    AST 18 01/13/2019    ALKPHOS 72 01/13/2019    BILITOT 0.6 01/13/2019        No flowsheet data found. Imaging:  No orders to display       Assessment & Plan:        Urinary Retention  UA/UC  Follow CBC, Renal labs  Uriology following. Appreciate assistance. - S/p Cysto with evacuation of stents and CBI  - CBI currently held. Urine is clear. S/p fulgeration of prostate 01/19/2019 by Urology   - ASA was held, per Urology rec - as he will need TURP shortly after discharge  Flomax    Hematuria  S/p fulgeration of prostate 01/19/2019 by Urology and CBI  CBI currently being held. Urine is clear. Resume CBI for blood in the urine. Will need TURP 10 - 14 days after DC. DM II  Not requiring insulin  Monitor FSBG  Initiate insulin PRN    HTN  Lisinopril  Monitor BP  Titrate medications as needed. HLD  Statin  Monitor for S/S of Rhabdomyolysis     Body mass index is 19.19 kg/m². The patient and / or the family were informed of the results of any tests, a time was given to answer questions, a plan was proposed and they agreed with plan.     DVT prophylaxis: [] Lovenox  [] SQ Heparin  [x] SCDs because of  hematuria [] warfarin/oral direct thrombin inhibitor [] Encourage

## 2019-01-20 NOTE — PROGRESS NOTES
Discussed with his nurse. His urine is clear on CBI  I advised to stop bladder irrigation for now and resume for blood tinged urine.     If urine remains clear, ok to dc home tomorrow with wilcox and will need TURP eventually in 10-14 days with Dr Collette Sluder

## 2019-01-20 NOTE — PROGRESS NOTES
4 Eyes Admission Assessment     I agree as the admission nurse that 2 RN's have performed a thorough Head to Toe Skin Assessment on the patient. ALL assessment sites listed below have been assessed on admission. Areas assessed by both nurses:   [x]   Head, Face, and Ears   [x]   Shoulders, Back, and Chest  [x]   Arms, Elbows, and Hands   [x]   Coccyx, Sacrum, and Ischum  [x]   Legs, Feet, and Heels        Does the Patient have Skin Breakdown?   No         Julio C Prevention initiated:  Yes   Wound Care Orders initiated:  NA      [facility] nurse consulted for Pressure Injury (Stage 3,4, Unstageable, DTI, NWPT, and Complex wounds):  NA      Nurse 1 eSignature: Electronically signed by Mary Cohen RN on 1/20/19 at 12:43 AM    **SHARE this note so that the co-signing nurse is able to place an eSignature**    Nurse 2 eSignature: Electronically signed by Bong Li RN on 1/20/19 at 12:46 AM

## 2019-01-20 NOTE — PROGRESS NOTES
Patient is alert & oriented x4,, 2/4 bed rails up, bed in lowest position, fall precautions in place, call light within reach. CBI going. Morning medications given. Morning assessment complete. Will cont to monitor and reassess.   Electronically signed by Ramon Byrne RN on 1/20/2019 at 9:06 AM

## 2019-01-20 NOTE — PROGRESS NOTES
Turned pt's CBI off. Urine is clear, pale yellow. Will continue to monitor for any pink tinged urine, if it happens CBI will have to turn back on. Pt made aware and verbalized understanding.  Electronically signed by Renetta Hamm RN on 1/20/2019 at 10:43 AM

## 2019-01-20 NOTE — PROGRESS NOTES
Pt resting with eyes closed and call light in reach, A&O x4 calm and cooperative with care took and tolerated medications with out difficulty. Denies pain, VSS will cont to monitor.

## 2019-01-20 NOTE — OP NOTE
[address] [facility]                                OPERATIVE REPORT    PATIENT NAME: Simeon Medel                    :        1930  MED REC NO:   [id number]                          ROOM:       3115  ACCOUNT NO:   [de-identified]                           ADMIT DATE: 2019  PROVIDER:     Julia Rosas DO    DATE OF PROCEDURE:  2019    PREOPERATIVE DIAGNOSIS:  Gross hematuria with urinary clot retention. POSTOPERATIVE DIAGNOSIS:  Gross hematuria with urinary clot retention. OPERATIONS PERFORMED:  Cystourethroscopy with evacuation of clots and  fulguration of prostatic fossa. SURGEON:  Julia Rosas DO    INDICATIONS:  This is an 66-year-old who went into urinary retention  approximately six days ago when he had a small bore indwelling Daniel. Brought to the emergency room today. The Daniel catheter was not  draining and it was replaced and still did not drain. Following that, a  large bore Daniel was placed and this would not irrigate. He then had a  bedside ultrasound showing foreign bodies in the bladder consistent with  clot. I examined and evaluated the patient and recommended we proceed  to cystoscopy. OPERATIVE PROCEDURE:  The patient was taken to the surgery suite and  under general anesthesia, placed in dorsal lithotomy position and  prepped and draped in the usual sterile fashion. A 21-Prydeinig rigid  cystoscope was passed. The anterior urethra was without stricture. The  prostatic fossa was elongated, and there was some bleeding coming from  the floor of the fossa. Once I got into the bladder, there was some  clot and it was difficult to see. I removed the cystoscope and passed a 28-Prydeinig resectoscope. I  utilized a piston syringe to evacuate about 30 mL of clot. Once I did  this, I put the resectoscope in with a rollerball electrode.   There were  a few more clots that I removed. There was actually less clot than I  expected. The bladder was severely trabeculated with cellules and  diverticula. The ureteral orifices were normal.  There was no evidence  of tumor. There were inflammatory changes from the Daniel. I then made  a careful inspection of the prostate, and there was some active bleeding  on the floor of the prostate. I utilized a rollerball electrode to  careful fulgurate this. I then placed a 22-Luxembourgish three-way Daniel with  30 mL in the balloon, the irrigants returning light pink. The procedure was completed, and the patient will be observed overnight  on three-way irrigation. He will likely go home with the indwelling  Daniel, and I suspect he will need a TURP in the near future. We need to hold the aspirin to make this possible.         [address] DO Jelani    D: 01/19/2019 17:43:24       T: 01/20/2019 0:57:32     [email or web address]  Job#: [id number]     Doc#: [id number]    CC:

## 2019-01-20 NOTE — PROGRESS NOTES
Pt resting with eyes closed anc call light in reach, A&O x4 cooperative with care VSS wioll cont to monitor.

## 2019-01-20 NOTE — CONSULTS
[address] [address]                                  CONSULTATION    PATIENT NAME: Mi Ingram                    :        1930  MED REC NO:   [id number]                          ROOM:       3115  ACCOUNT NO:   [de-identified]                           ADMIT DATE: 2019  PROVIDER:     Ellie Ng DO    CONSULT DATE:  2019    CHIEF COMPLAINT:  Urinary retention with Daniel catheter not draining and  hematuria. HISTORY OF CHIEF COMPLAINT:  This is an 77-year-old who was seen in the  ED with constipation on 2019. It was noted on the CAT scan at  that time that he also had a distended bladder and mild bilateral  hydronephrosis. A Daniel catheter was placed. Urology was consulted. Unfortunately, the patient's Daniel catheter clogged off today and he was  seen in the ED, there was blood coming out of it and a new catheter was  placed but this would not irrigate. Following this, a large bore Daniel  was placed and this would not irrigate either. A bedside ultrasound was  done by the emergency room attending showing a large volume of blood  clots in the bladder. The patient has been uncomfortable. He has been  on aspirin. PAST MEDICAL HISTORY:  Positive for claudication, hypertension, coronary  artery disease, hyperlipidemia, history of CVA and hearing loss. PAST SURGICAL HISTORY:  Positive for angioplasty and stents in 2015,  distant history of herniorrhaphy and distant fem-pop bypass in . CHRONIC MEDICATIONS:  Include aspirin, citalopram, Colace, gabapentin,  lisinopril, multivitamins, polyethylene glycol, ranitidine, Zocor and  Flomax. ALLERGIES:  Include PENICILLIN. FAMILY HISTORY:  Noncontributory. SOCIAL HISTORY:  The patient is a former smoker. He has an adult  daughter who is at the bedside.     PHYSICAL EXAMINATION:  GENERAL:  Exam shows him to be hard of hearing, elderly male, somewhat  thin, in mild distress. He has had pain medicine. VITAL SIGNS:  Temperature 97.7, blood pressure 145/54, O2 sat 100%,  pulse 86. HEENT:  Head and neck exam shows no masses. NECK:  Supple. LUNGS:  Clear to auscultation. HEART:  Regular rate and rhythm. ABDOMEN:  Soft but there was a tender mass extending up to the  umbilicus. :  A large bore Daniel catheter is draining bloody urine. The testes  are descended without masses. EXTREMITIES:  Mildly edematous. Peripheral pulses are nonpalpable. SKIN:  Shows no obvious lesions. NEUROLOGIC:  There is no focal neurologic deficits. LYMPH:  There is no lymphadenopathy in the axillary, inguinal or  supraclavicular area. LABORATORY DATA:  His admission labs showed a normal electrolyte with a  creatinine of 0.8. His hemoglobin 13.6, hematocrit 38.7. ProTime is  normal at 11.2 and his platelet counts are normal at 279. I reviewed the patient's bedside ultrasound with Dr. Char Hughes and we  concurred there are clots in the bladder. IMPRESSION:  Urinary clot retention with hematuria secondary to massive  BPH. PLAN:  I counseled the patient and his family that we need to proceed to  the operating room to successfully evacuate these blood clots. This  will happen today. He ultimately will likely need a transurethral  prostatectomy.         Malia Callaway DO    D: 01/19/2019 16:37:04       T: 01/20/2019 2:00:09     [email or web address]  Job#: [id number]     Doc#: [id number]    CC:

## 2019-01-21 VITALS
OXYGEN SATURATION: 97 % | HEART RATE: 64 BPM | TEMPERATURE: 98.3 F | RESPIRATION RATE: 16 BRPM | SYSTOLIC BLOOD PRESSURE: 157 MMHG | BODY MASS INDEX: 19.91 KG/M2 | HEIGHT: 71 IN | WEIGHT: 142.2 LBS | DIASTOLIC BLOOD PRESSURE: 74 MMHG

## 2019-01-21 LAB
ANION GAP SERPL CALCULATED.3IONS-SCNC: 10 MMOL/L (ref 3–16)
BASOPHILS ABSOLUTE: 0 K/UL (ref 0–0.2)
BASOPHILS RELATIVE PERCENT: 0.5 %
BUN BLDV-MCNC: 13 MG/DL (ref 7–20)
CALCIUM SERPL-MCNC: 8.6 MG/DL (ref 8.3–10.6)
CHLORIDE BLD-SCNC: 95 MMOL/L (ref 99–110)
CO2: 26 MMOL/L (ref 21–32)
CREAT SERPL-MCNC: 0.7 MG/DL (ref 0.8–1.3)
EKG ATRIAL RATE: 80 BPM
EKG DIAGNOSIS: NORMAL
EKG P AXIS: 89 DEGREES
EKG P-R INTERVAL: 148 MS
EKG Q-T INTERVAL: 378 MS
EKG QRS DURATION: 90 MS
EKG QTC CALCULATION (BAZETT): 435 MS
EKG R AXIS: 46 DEGREES
EKG T AXIS: 71 DEGREES
EKG VENTRICULAR RATE: 80 BPM
EOSINOPHILS ABSOLUTE: 0.3 K/UL (ref 0–0.6)
EOSINOPHILS RELATIVE PERCENT: 3.2 %
GFR AFRICAN AMERICAN: >60
GFR NON-AFRICAN AMERICAN: >60
GLUCOSE BLD-MCNC: 101 MG/DL (ref 70–99)
HCT VFR BLD CALC: 32.3 % (ref 40.5–52.5)
HEMOGLOBIN: 11.3 G/DL (ref 13.5–17.5)
LYMPHOCYTES ABSOLUTE: 0.7 K/UL (ref 1–5.1)
LYMPHOCYTES RELATIVE PERCENT: 8.3 %
MCH RBC QN AUTO: 33.6 PG (ref 26–34)
MCHC RBC AUTO-ENTMCNC: 34.8 G/DL (ref 31–36)
MCV RBC AUTO: 96.4 FL (ref 80–100)
MONOCYTES ABSOLUTE: 0.9 K/UL (ref 0–1.3)
MONOCYTES RELATIVE PERCENT: 10.7 %
NEUTROPHILS ABSOLUTE: 6.3 K/UL (ref 1.7–7.7)
NEUTROPHILS RELATIVE PERCENT: 77.3 %
PDW BLD-RTO: 13.5 % (ref 12.4–15.4)
PLATELET # BLD: 250 K/UL (ref 135–450)
PMV BLD AUTO: 9.1 FL (ref 5–10.5)
POTASSIUM REFLEX MAGNESIUM: 4.2 MMOL/L (ref 3.5–5.1)
RBC # BLD: 3.35 M/UL (ref 4.2–5.9)
SODIUM BLD-SCNC: 131 MMOL/L (ref 136–145)
URINE CULTURE, ROUTINE: NORMAL
WBC # BLD: 8.1 K/UL (ref 4–11)

## 2019-01-21 PROCEDURE — 97162 PT EVAL MOD COMPLEX 30 MIN: CPT

## 2019-01-21 PROCEDURE — 36415 COLL VENOUS BLD VENIPUNCTURE: CPT

## 2019-01-21 PROCEDURE — 6370000000 HC RX 637 (ALT 250 FOR IP): Performed by: INTERNAL MEDICINE

## 2019-01-21 PROCEDURE — 97110 THERAPEUTIC EXERCISES: CPT

## 2019-01-21 PROCEDURE — 80048 BASIC METABOLIC PNL TOTAL CA: CPT

## 2019-01-21 PROCEDURE — 97166 OT EVAL MOD COMPLEX 45 MIN: CPT

## 2019-01-21 PROCEDURE — G8988 SELF CARE GOAL STATUS: HCPCS

## 2019-01-21 PROCEDURE — 97535 SELF CARE MNGMENT TRAINING: CPT

## 2019-01-21 PROCEDURE — 94760 N-INVAS EAR/PLS OXIMETRY 1: CPT

## 2019-01-21 PROCEDURE — G8978 MOBILITY CURRENT STATUS: HCPCS

## 2019-01-21 PROCEDURE — 97116 GAIT TRAINING THERAPY: CPT

## 2019-01-21 PROCEDURE — 6370000000 HC RX 637 (ALT 250 FOR IP): Performed by: NURSE PRACTITIONER

## 2019-01-21 PROCEDURE — G8979 MOBILITY GOAL STATUS: HCPCS

## 2019-01-21 PROCEDURE — G0378 HOSPITAL OBSERVATION PER HR: HCPCS

## 2019-01-21 PROCEDURE — G8987 SELF CARE CURRENT STATUS: HCPCS

## 2019-01-21 PROCEDURE — 85025 COMPLETE CBC W/AUTO DIFF WBC: CPT

## 2019-01-21 PROCEDURE — 2580000003 HC RX 258: Performed by: INTERNAL MEDICINE

## 2019-01-21 PROCEDURE — 97530 THERAPEUTIC ACTIVITIES: CPT

## 2019-01-21 RX ORDER — SENNA AND DOCUSATE SODIUM 50; 8.6 MG/1; MG/1
2 TABLET, FILM COATED ORAL 2 TIMES DAILY
Status: DISCONTINUED | OUTPATIENT
Start: 2019-01-21 | End: 2019-01-21 | Stop reason: HOSPADM

## 2019-01-21 RX ORDER — POLYETHYLENE GLYCOL 3350 17 G/17G
17 POWDER, FOR SOLUTION ORAL DAILY
Status: DISCONTINUED | OUTPATIENT
Start: 2019-01-21 | End: 2019-01-21 | Stop reason: HOSPADM

## 2019-01-21 RX ADMIN — FLUTICASONE PROPIONATE 2 SPRAY: 50 SPRAY, METERED NASAL at 11:28

## 2019-01-21 RX ADMIN — CITALOPRAM HYDROBROMIDE 10 MG: 10 TABLET ORAL at 08:22

## 2019-01-21 RX ADMIN — FAMOTIDINE 20 MG: 20 TABLET ORAL at 08:22

## 2019-01-21 RX ADMIN — Medication 10 ML: at 08:25

## 2019-01-21 RX ADMIN — DOCUSATE SODIUM AND SENNOSIDES 2 TABLET: 8.6; 5 TABLET, FILM COATED ORAL at 11:28

## 2019-01-21 RX ADMIN — LISINOPRIL 10 MG: 10 TABLET ORAL at 08:22

## 2019-01-21 RX ADMIN — TAMSULOSIN HYDROCHLORIDE 0.4 MG: 0.4 CAPSULE ORAL at 08:22

## 2019-01-21 RX ADMIN — GABAPENTIN 100 MG: 100 CAPSULE ORAL at 08:22

## 2019-01-21 RX ADMIN — POLYETHYLENE GLYCOL 3350 17 G: 17 POWDER, FOR SOLUTION ORAL at 11:28

## 2019-01-21 ASSESSMENT — PAIN SCALES - GENERAL
PAINLEVEL_OUTOF10: 0

## 2019-01-21 ASSESSMENT — PAIN DESCRIPTION - LOCATION: LOCATION: ABDOMEN

## 2019-01-21 NOTE — PROGRESS NOTES
Pt resting with eyes closed and call light in reach. Took and tolerated medications with out difficulty and denies pain. VSS will cont to monitor.

## 2019-01-21 NOTE — PROGRESS NOTES
Pt dc'd home, instructions read and understood by pt and family, iv dc'd abbocath intact.   Electronically signed by Breanna Tristan RN on 1/21/2019 at 6:08 PM

## 2019-01-21 NOTE — DISCHARGE SUMMARY
Hospital Medicine Discharge Summary      Patient ID: Travis Hay      Patient's PCP: Eduarda Canales    Admit Date: 1/19/2019     Discharge Date:   01/21/19     Admitting Physician: Dina Mark MD     Discharge Provider: Ferne Blizzard, APRN - CNP     Discharge Diagnoses: Active Hospital Problems    Diagnosis Date Noted    Urinary retention [R33.9] 01/19/2019    Hematuria [R31.9] 01/19/2019    Bilateral hearing loss [H91.93] 01/19/2019    Hyperlipidemia [E78.5] 08/03/2018    Essential hypertension, benign [I10] 06/20/2012       Disposition:  [] Home  [x] Home with home health [] Rehab [] Psych [] SNF  [] LTAC  [] Long term nursing home or group home [] Transfer to ICU  [] Transfer to PCU [] Other:    [facility] [facility] [address] [facility] [address] [facility] [phone]    Call in 1 week  for hospital follow up    Jono Gerard, [address] [facility] [address] [facility] [phone]      return for schueduled surgery on 1/30/2019      Discharge Condition: stable      Code Status:  Full Code     Activity: activity as tolerated    Diet: DIET GENERAL;     Discharge Medications:     Current Discharge Medication List           Details   docusate sodium (COLACE) 100 MG capsule Take 100 mg by mouth 2 times daily      tamsulosin (FLOMAX) 0.4 MG capsule Take 0.4 mg by mouth daily      polyethylene glycol (GLYCOLAX) powder Take 17 g by mouth daily      gabapentin (NEURONTIN) 100 MG capsule Take 100 mg by mouth 2 times daily. .      citalopram (CELEXA) 10 MG tablet Take 1 tablet by mouth daily      fluticasone (FLONASE) 50 MCG/ACT nasal spray 2 sprays by Nasal route daily      Multiple Vitamins-Minerals (PRESERVISION AREDS 2 PO) Take  by mouth 2 times daily. ranitidine (ZANTAC) 300 MG tablet   Take 300 mg by mouth nightly       lisinopril (PRINIVIL;ZESTRIL) 10 MG tablet Take 10 mg by mouth daily. simvastatin (ZOCOR) 20 MG tablet Take 20 mg by mouth nightly.              The patient was seen and examined on day of discharge and this discharge summary is in conjunction with any daily progress note from day of discharge. Hospital Course:    this is an 42-year-old male with a history of hypertension, hyperlipidemia and most recently had urinary retention in which a Wilcox catheter was placed who presented to the ED with complaints of his Wilcox catheter not properly draining. He was taken emergently to the OR for cystoscopy with evacuation of clot and fulguration of the prostate. He was placed on CBI. This has since resolved. He is scheduled for surgery next week on January 30. He will continue Flomax. Antibiotics were stopped. He will be discharged with Wilcox catheter placed and home care. PT and OT also evaluated patient and recommended home health with PT. At this time he will be discharged in stable condition to follow-up as an outpatient. He has verbalized understanding and is in agreement with the plan and care at this time. Exam:     BP (!) 157/74   Pulse 64   Temp 98.3 °F (36.8 °C) (Oral)   Resp 16   Ht 5' 11\" (1.803 m)   Wt 142 lb 3.2 oz (64.5 kg)   SpO2 97%   BMI 19.83 kg/m²     General: Resting in bed in no apparent distress. Frail appearing  HEENT: Normocephalic. Atraumatic. Pupils equal and reactive. EOM intact. Oral mucosa pink/moist/intact. Neck: Supple. Symmetrical. Trachea midline. Lungs: Clear to auscultation bilaterally. Respirations even and unlabored. Chest: Exam unremarkable. Cardiac: S1/S2 noted. Regular Rhythm and rate. Abdomen/GI: Soft. Non-tender. Non-distended. BS+. Extremities: PP+. Atraumatic. No redness/cyanosis/edema noted. Brisk cap refill. Skin: Dry and intact. No lesions noted. Neuro: Grossly intact. No focal deficits noted. : wilcox in place with clear yellow urine    Consults:     IP CONSULT TO UROLOGY  IP CONSULT TO HOME CARE NEEDS    Significant Diagnostic Studies:         Labs:  For convenience and continuity at follow-up the following most recent labs are provided:    CBC:    Lab Results   Component Value Date    WBC 8.1 01/21/2019    HGB 11.3 01/21/2019    HCT 32.3 01/21/2019     01/21/2019       Renal:    Lab Results   Component Value Date     01/21/2019    K 4.2 01/21/2019    CL 95 01/21/2019    CO2 26 01/21/2019    BUN 13 01/21/2019    CREATININE 0.7 01/21/2019    CALCIUM 8.6 01/21/2019       Future Appointments  Date Time Provider Kamar Chairez   2/4/2019 9:30 AM SCHEDULE, VASCULAR LAB 1 [facility]   2/4/2019 10:15 AM FABY Godwin - [facility]       Time Spent on discharge is more than 30 minutes in the examination, evaluation, counseling and review of medications and discharge plan. RX monitoring reviewed     Signed:    FABY Acevedo CNP   1/21/2019      Thank you Lencho Jaffe for the opportunity to be involved in this patient's care. If you have any questions or concerns please feel free to contact me at [phone].

## 2019-01-21 NOTE — DISCHARGE INSTR - COC
Continuity of Care Form    Patient Name: Miguel Ragsdale   :  1930  MRN:  [id number]    Admit date:  2019  Discharge date:  19    Code Status Order: Full Code   Advance Directives:   [facility] Documentation     Date/Time Healthcare Directive Type of Healthcare Directive Copy in [address] Agent's Name Healthcare Agent's Phone Number    19 1623  No, patient does not have an advance directive for healthcare treatment -- -- -- -- --          Admitting Physician:  John Mix MD  PCP: Armando Tang    Discharging Nurse: Shwetha Raymond [facility] Unit/Room#: Z0P-0357/3115-01  Discharging Unit Phone Number: [phone]    Emergency Contact:   Extended Emergency Contact Information  Primary Emergency Contact: Lita Damian [address] Phone: [phone]  Relation: Child  Secondary Emergency Contact: Jake Carter [address] Phone: [phone]  Relation: Child    Past Surgical History:  Past Surgical History:   Procedure Laterality Date    ANGIOPLASTY      STENT/PTLA    BACK SURGERY  May 2015    lumbar fusion    CYSTOSCOPY  2019    CYSTOSCOPY EVACUATION OF CLOTS, fulgeration of prostate    CYSTOSCOPY N/A 2019    CYSTOSCOPY EVACUATION OF CLOTS, fulgeration of prostate performed by Adal Rodriguez DO at [address] GRAFT Right 8/3/10    Right above knee popliteal to posterior tibial artery bypass graft with non reversed translocated great saphenous vein    TUMOR REMOVAL      chest area       Immunization History: There is no immunization history on file for this patient.     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) I70.209  Hyperlipidemia E78.5    Urinary retention R33.9    Hematuria R31.9    Bilateral hearing loss H91.93       Isolation/Infection:   Isolation          No Isolation            Nurse Assessment:  Last Vital Signs: BP (!) 157/74   Pulse 64   Temp 98.3 °F (36.8 °C) (Oral)   Resp 16   Ht 5' 11\" (1.803 m)   Wt 142 lb 3.2 oz (64.5 kg)   SpO2 97%   BMI 19.83 kg/m²     Last documented pain score (0-10 scale): Pain Level: 0  Last Weight:   Wt Readings from Last 1 Encounters:   01/21/19 142 lb 3.2 oz (64.5 kg)     Mental Status:  oriented    IV Access:  - None    Nursing Mobility/ADLs:  Walking   Assisted  Transfer  Assisted  Bathing  Assisted  Dressing  Assisted  Toileting  Assisted  Feeding  Independent  Med Admin  Independent  Med Delivery   whole    Wound Care Documentation and Therapy:        Elimination:  Continence:   · Bowel: Yes  · Bladder: Yes  Urinary Catheter: Insertion Date: 1/12/19   Colostomy/Ileostomy/Ileal Conduit: No       Date of Last BM:     Intake/Output Summary (Last 24 hours) at 01/21/19 1523  Last data filed at 01/21/19 1343   Gross per 24 hour   Intake              840 ml   Output             2675 ml   Net            -1835 ml     I/O last 3 completed shifts: In: 840 [P.O.:840]  Out: 2675 [Urine:2675]    Safety Concerns:     None    Impairments/Disabilities:      None    Nutrition Therapy:  Current Nutrition Therapy:   - Oral Diet:  General    Routes of Feeding: Oral  Liquids: Thin Liquids  Daily Fluid Restriction: no  Last Modified Barium Swallow with Video (Video Swallowing Test): not done    Treatments at the Time of Hospital Discharge:   Respiratory Treatments:   Oxygen Therapy:  is not on home oxygen therapy.   Ventilator:    - No ventilator support    Rehab Therapies: Physical Therapy and Occupational Therapy  Weight Bearing Status/Restrictions: No weight bearing restirctions  Other Medical Equipment (for information only, NOT a DME order):  walker  Other Treatments:     Patient's personal belongings (please select all that are sent with patient):  None    RN SIGNATURE:  Electronically signed by Hans Urbano RN on 1/21/19 at 4:16 PM    CASE MANAGEMENT/SOCIAL WORK SECTION    Inpatient Status Date: 1/19/19    Readmission Risk Assessment Score:  Readmission Risk              Risk of Unplanned Readmission:        12           Discharging to Facility/ Agency   · Name: [facility]   · Address: [address] [address] [address]  · Phone: [phone]  · Fax:      / signature: Electronically signed by Jostin Coker on 1/21/19 at 3:24 PM    PHYSICIAN SECTION    Prognosis: Good    Condition at Discharge: Stable    Rehab Potential (if transferring to Rehab): Good    Recommended Labs or Other Treatments After Discharge: PT/OT, help with Fredy    Physician Certification: I certify the above information and transfer of Kelley Mask  is necessary for the continuing treatment of the diagnosis listed and that he requires Home Care for greater 30 days.      Update Admission H&P: No change in H&P    PHYSICIAN SIGNATURE:  Electronically signed by FABY Mccartney CNP on 1/21/19 at 3:53 PM/Lakshmi Parkinson

## 2019-01-21 NOTE — FLOWSHEET NOTE
Physical Therapy Daily Treatment Note    In case of discharge prior to next PT visit, please accept this as discharge note. Date:  2019    Patient Name:  Mason Patrick    :  1930  MRN: [id number]  Restrictions/Precautions:    Pertinent Medical History:The primary encounter diagnosis was Clot retention of urine. A diagnosis of Acute cystitis with hematuria was also pertinent to this visit.      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 has a past surgical history that includes tumor removal; hernia repair; angioplasty; IR Femoral Popliteal Bypass Graft (Right, 8/3/10); back surgery (May 2015); Cystocopy (2019); and Cystoscopy (N/A, 2019).    Medical/Treatment Diagnosis Information:  ·    · Treatment Diagnosis: Difficutly with walking, slight weakness in LE's, balance concerns while sitting (leans backward slightly)  ·   Insurance/Certification information:     Physician Information:   Nicci Sweet MD (PCP)  Plan of care signed (Y/N):    Visit# / total visits:    Pain level: 0/10     G-Code (if applicable):      Date / Visit # G-Code Applied:  /  PT G-Codes  Functional Limitation: Mobility: Walking and moving around  Mobility: Walking and Moving Around Current Status (): At least 40 percent but less than 60 percent impaired, limited or restricted  Mobility: Walking and Moving Around Goal Status ():  At least 20 percent but less than 40 percent impaired, limited or restricted    Progress Note: []  Yes  [x]  No  Next due by: Visit #10      History of Injury: See above  Subjective:   See eval    Objective: See

## 2019-01-21 NOTE — PROGRESS NOTES
Occupational Therapy   Occupational Therapy Initial Assessment  See last progress note for discharge status. Date: 2019   Patient Name: Kuldip Maloney  MRN: [id number]     : 1930    Date of Service: 2019    Discharge Recommendations:  Home with Home health OT, S Level 3         Patient Diagnosis(es): The primary encounter diagnosis was Clot retention of urine. A diagnosis of Acute cystitis with hematuria was also pertinent to this visit. 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 has a past surgical history that includes tumor removal; hernia repair; angioplasty; IR Femoral Popliteal Bypass Graft (Right, 8/3/10); back surgery (May 2015); Cystocopy (2019); and Cystoscopy (N/A, 2019). Restrictions  Restrictions/Precautions  Restrictions/Precautions: Fall Risk (One fall in the last 6 months)  Position Activity Restriction  Other position/activity restrictions: poor vision and very [place]    Subjective   General  Chart Reviewed: Yes  Patient assessed for rehabilitation services?: Yes  Family / Caregiver Present: Yes (daughter, Ian Donaldson)  Diagnosis: hematuria, 19 cystoscopy with evacuation of clots, planned TURP in 9 days  Subjective  Subjective: \"I'm good. \"  General Comment  Comments: Pt in recliner, agreeable to OT  Pain Assessment  Patient Currently in Pain: No  Pain Assessment: 0-10  Pain Level: 0  Pain Location: Abdomen  Patient's Stated Pain Goal: No pain     Social/Functional History  Social/Functional History  Lives With: Alone  Type of Home: Apartment  Home Layout: Two level, One level  Home Access: Elevator, Ramped entrance  Bathroom Shower/Tub: Walk-in shower  Bathroom Toilet: Handicap height  Bathroom Equipment: Built-in shower seat, Grab bars in shower, Grab bars around toilet  Home Equipment: Rolling walker, Cane, [address] Help From: Family  ADL Assistance: Independent  Homemaking Assistance: Needs assistance (has cleaning lady; pt makes his meals)  Ambulation Assistance: Independent (uses cane inside and walker or w/c when out)  Active : No  Occupation: Retired  Additional Comments: uses [employer] for transportation, except daughter takes him to  and to [email or web address]       Objective   Vision: Impaired (has macular degeneration)  Vision Exceptions: Wears glasses for reading  Hearing: Exceptions to [place] [employer] [facility] [employer]  Hearing Exceptions: Bilateral hearing aid    Orientation  Overall Orientation Status: Within Functional Limits     Standing Balance  Sit to stand: Stand by assistance (from recliner chair)  ADL  Feeding: Independent  Grooming: Setup  UE Bathing:  (NT, expect SBA and cues)  UE Dressing: Setup  LE Dressing: Supervision  Toileting:  (Has Daniel with leg bag)        Bed mobility  Supine to Sit: Modified independent  Sit to Supine: Modified independent  Transfers  Stand Step Transfers: Stand by assistance (for recliner to bed furiture walking)  Sit to stand: Stand by assistance (from recliner chair)     Cognition  Overall Cognitive Status: WFL        Sensation  Overall Sensation Status: WFL        LUE AROM (degrees)  LUE AROM : WFL  RUE AROM (degrees)  RUE AROM : WFL                     Assessment   Performance deficits / Impairments: Decreased functional mobility ; Decreased ADL status; Decreased high-level IADLs  Prognosis: Good  Decision Making: Medium Complexity  History: hx of HTN, pulmonary tuberculosis, cerebral infarct, LE edema, cardiac ischemia and skin ca, now with hematuria and planned TURP  Exam: ADLS, mobility  Assistance / Modification: walker,assist, cues  REQUIRES OT FOLLOW UP: Yes  Activity Tolerance  Activity Tolerance: Patient Tolerated treatment well  Activity Tolerance: Limited by poor vision and hearing  Safety Devices  Safety Devices in place: Yes  Type of devices: Nurse notified; Left in bed;Call light within reach; Bed alarm in place         Plan   Plan  Times per week: 3-5x  Current Treatment Recommendations: Functional Mobility Training, Self-Care / ADL, Equipment Evaluation, Education, & procurement, Patient/Caregiver Education & Training    G-Code  OT G-codes  Functional Limitation: Self care  Self Care Current Status (): At least 40 percent but less than 60 percent impaired, limited or restricted  Self Care Goal Status (): At least 20 percent but less than 40 percent impaired, limited or restricted  OutComes Score    Gilmer Hunt scored a 19/24 on the AM-[facility] ADL Inpatient form. Current research shows that an -PAC score of 18 or greater is typically associated with a discharge to the patient's home setting. Based on the patients AM-PAC score and their current ADL deficits, it is recommended that the patient have 2-3 sessions per week of Occupational Therapy at d/c to increase the patients independence. AM-PAC Score        [facility] Inpatient Daily Activity Raw Score: 19  AM-PAC Inpatient ADL T-Scale Score : 40.22  ADL Inpatient CMS 0-100% Score: 42.8  ADL Inpatient CMS G-Code Modifier : CK    Goals  Short term goals  Time Frame for Short term goals: at d/c:  Short term goal 1: Set up for eating and grooming  Short term goal 2: Supervision for bathing and dressing  Short term goal 3: Independent bed mobility  Short term goal 4: Supervision for transfers  Patient Goals   Patient goals : to return home       Therapy Time   Individual Concurrent Group Co-treatment   Time In 1506         Time Out 1552         Minutes 46              Patient Education: Call for needs and for assist to get up.       Denis French

## 2019-01-21 NOTE — PROGRESS NOTES
Pt sitting up in bed, no s/s distress, resp e/e, denies pain at this time, wilcox still draining well, call light in reach.   Electronically signed by Katherine López RN on 1/21/2019 at 10:37 AM

## 2019-01-21 NOTE — PROGRESS NOTES
Catherter was leaking and that is another reason for the hosptial visit on 1/18/19. He has been using cane or walker for ambulation. .  Pain Screening  Patient Currently in Pain: No  Pain Assessment  Pain Assessment: 0-10  Pain Level: 0  Patient's Stated Pain Goal: No pain  Pain Location: Abdomen  Vital Signs  Patient Currently in Pain: Yes  Pre Treatment Pain Screening  Pain at present: 0    Orientation     Social/Functional History  Social/Functional History  Lives With: Alone  Type of Home: Apartment  Home Layout: Two level, One level  Home Access: Elevator, Ramped entrance  Bathroom Shower/Tub: Walk-in shower  Bathroom Toilet: Handicap height  Bathroom Equipment: Built-in shower seat, Grab bars in shower, Grab bars around toilet  Home Equipment: Rolling walker, Cane, [address] Help From: Family  ADL Assistance: Independent  Homemaking Assistance: Needs assistance (has cleaning lady; pt makes his meals)  Ambulation Assistance: Independent (uses cane inside and walker or w/c when out)  Active : No  Occupation: Retired  Additional Comments: uses [employer] for transportation, except daughter takes him to  and to [employer]   Cognition  Overall Cognitive Status: WNL    Objective          AROM RLE (degrees)  RLE AROM: WFL  AROM LLE (degrees)  LLE AROM : WFL  AROM RUE (degrees)  RUE AROM : WFL  AROM LUE (degrees)  LUE AROM : WFL  Strength RLE  Comment: Grossly 4-/5  Strength LLE  Comment: Grossly 4-/5  Strength RUE  Comment: Grossly 4-/5  Strength LUE  Comment: Grossly 4-/5     Sensation  Overall Sensation Status: WFL  Bed mobility  Supine to Sit: Modified independent  Sit to Supine: Modified independent  Transfers  Sit to Stand: Modified independent  Stand to sit: Modified independent (With cues for safety and feeling chair with back of knees before sitting)  Ambulation  Ambulation?: Yes  Ambulation 1  Surface: level tile  Device: Rolling Walker  Quality of Gait: Short stride Patient goals :  \"To move around better and go home\"       Therapy Time   Individual Concurrent Group Co-treatment   Time In 1345         Time Out 1445         Minutes 60         Timed Code Treatment Minutes: 1100 Vishal Cornell, PT

## 2019-01-21 NOTE — PLAN OF CARE
Problem: Falls - Risk of:  Goal: Will remain free from falls  Will remain free from falls   Outcome: Ongoing  Patient educated on fall prevention. Call light is within reach, bed locked in lowest position, personal items within reach, and bed alarm is on. Will round on patient per unit guidelines. Problem: Risk for Impaired Skin Integrity  Goal: Tissue integrity - skin and mucous membranes  Structural intactness and normal physiological function of skin and  mucous membranes. Outcome: Ongoing  Julio C score assessed. Patient able to ambulate and turn self. Repositioned patient Q2H and assessed skin. Educated patient on importance of repositioning to prevent skin issues.

## 2019-01-21 NOTE — PROGRESS NOTES
Pt resting with eyes closed and call light in reach, pt Urine monitored no visible signs of bleeding noted. Denies pain.  Will cont to monitor

## 2019-01-21 NOTE — CARE COORDINATION
INITIAL CASE MANAGEMENT ASSESSMENT    Reviewed chart, met with patient to assess possible discharge needs. Explained Case Management role/services. Living Situation: lives alone in Senior apartment building. There are no steps to enter and he has an elevator to his apartment. ADLs: fairly independent--although he is [facility] and has difficulty with vision. DME: he uses a walker or cane -- also has a wheelchair for when he make trips out in the community. He also has a Life Alert bracelet     PT/OT Recs: await eval     Active Services: cleaning lady    patient also states that the [employer] has been to his apartment and has made larger print numbers for his stove and other larger text for things in his apartment to make it easier for him for everyday tasks. Transportation: daughter transports     Medications: uses [facility] ([place]) for prescriptions-no issues    PCP: Dr Pj Odell      HD/PD: n/a    PLAN/COMMENTS: patient plans to return home to his apartment -- He does have a cleaning lady . He is agreeable to home care -- list provided. - His daughter Paulene Gowers here in room - she looked over the list an prefers [facility] care-- they do not accept [address]- second choice is [facility].  I spoke with Quincy Kovacs in intake # [id number] - she can accept -- she will get information per EPIC -- will need aide-VN/PT/OT     SW/CM provided contact information for patient or family to call with any questions   SW/CM will follow and assist as needed   Electronically signed by Dalia Garcia on 1/21/2019 at 3:47 PM

## 2019-01-21 NOTE — PLAN OF CARE
Problem: Falls - Risk of:  Goal: Will remain free from falls  Will remain free from falls   Outcome: Ongoing  Will remain free from falls. Electronically signed by Breanna Tristan RN on 1/21/2019 at 7:37 AM    Goal: Absence of physical injury  Absence of physical injury   Outcome: Ongoing  Absence of physical injury. Electronically signed by Breanna Tristan RN on 1/21/2019 at 7:38 AM      Problem: Risk for Impaired Skin Integrity  Goal: Tissue integrity - skin and mucous membranes  Structural intactness and normal physiological function of skin and  mucous membranes. Outcome: Ongoing  Structural intactness and normal physiological function of skin and mucous membranes.   Electronically signed by Breanna Tristan RN on 1/21/2019 at 7:38 AM

## 2019-02-04 ENCOUNTER — PROCEDURE VISIT (OUTPATIENT)
Dept: SURGERY | Age: 84
End: 2019-02-04
Payer: MEDICARE

## 2019-02-04 ENCOUNTER — OFFICE VISIT (OUTPATIENT)
Dept: SURGERY | Age: 84
End: 2019-02-04
Payer: MEDICARE

## 2019-02-04 VITALS — WEIGHT: 134.8 LBS | DIASTOLIC BLOOD PRESSURE: 72 MMHG | SYSTOLIC BLOOD PRESSURE: 116 MMHG | BODY MASS INDEX: 18.8 KG/M2

## 2019-02-04 DIAGNOSIS — I10 ESSENTIAL HYPERTENSION, BENIGN: ICD-10-CM

## 2019-02-04 DIAGNOSIS — I65.23 BILATERAL CAROTID ARTERY STENOSIS WITHOUT CEREBRAL INFARCTION: Primary | ICD-10-CM

## 2019-02-04 DIAGNOSIS — I73.9 PAD (PERIPHERAL ARTERY DISEASE) (HCC): ICD-10-CM

## 2019-02-04 DIAGNOSIS — E78.5 HYPERLIPIDEMIA, UNSPECIFIED HYPERLIPIDEMIA TYPE: ICD-10-CM

## 2019-02-04 DIAGNOSIS — I70.203 ATHEROSCLEROSIS OF NATIVE ARTERY OF BOTH LOWER EXTREMITIES, WITH UNSPECIFIED PRESENCE OF CLINICAL MANIFESTATION (HCC): ICD-10-CM

## 2019-02-04 PROCEDURE — 93880 EXTRACRANIAL BILAT STUDY: CPT | Performed by: SURGERY

## 2019-02-04 PROCEDURE — 93925 LOWER EXTREMITY STUDY: CPT | Performed by: SURGERY

## 2019-02-04 PROCEDURE — 99214 OFFICE O/P EST MOD 30 MIN: CPT | Performed by: NURSE PRACTITIONER

## 2019-02-04 RX ORDER — GABAPENTIN 100 MG/1
200 CAPSULE ORAL 2 TIMES DAILY
COMMUNITY
End: 2019-02-11

## 2019-02-11 ENCOUNTER — TELEPHONE (OUTPATIENT)
Dept: SURGERY | Age: 84
End: 2019-02-11

## 2019-08-05 ENCOUNTER — OFFICE VISIT (OUTPATIENT)
Dept: SURGERY | Age: 84
End: 2019-08-05
Payer: MEDICARE

## 2019-08-05 ENCOUNTER — PROCEDURE VISIT (OUTPATIENT)
Dept: SURGERY | Age: 84
End: 2019-08-05
Payer: MEDICARE

## 2019-08-05 VITALS
DIASTOLIC BLOOD PRESSURE: 70 MMHG | WEIGHT: 140 LBS | BODY MASS INDEX: 19.53 KG/M2 | HEART RATE: 62 BPM | SYSTOLIC BLOOD PRESSURE: 172 MMHG

## 2019-08-05 DIAGNOSIS — I65.23 BILATERAL CAROTID ARTERY STENOSIS WITHOUT CEREBRAL INFARCTION: ICD-10-CM

## 2019-08-05 DIAGNOSIS — E78.5 HYPERLIPIDEMIA, UNSPECIFIED HYPERLIPIDEMIA TYPE: ICD-10-CM

## 2019-08-05 DIAGNOSIS — I70.203 ATHEROSCLEROSIS OF NATIVE ARTERY OF BOTH LOWER EXTREMITIES, WITH UNSPECIFIED PRESENCE OF CLINICAL MANIFESTATION (HCC): Primary | ICD-10-CM

## 2019-08-05 DIAGNOSIS — I73.9 PAD (PERIPHERAL ARTERY DISEASE) (HCC): ICD-10-CM

## 2019-08-05 DIAGNOSIS — I10 ESSENTIAL HYPERTENSION: ICD-10-CM

## 2019-08-05 DIAGNOSIS — I70.203 ATHEROSCLEROSIS OF NATIVE ARTERY OF BOTH LOWER EXTREMITIES, WITH UNSPECIFIED PRESENCE OF CLINICAL MANIFESTATION (HCC): ICD-10-CM

## 2019-08-05 DIAGNOSIS — I65.23 BILATERAL CAROTID ARTERY STENOSIS WITHOUT CEREBRAL INFARCTION: Primary | ICD-10-CM

## 2019-08-05 PROCEDURE — 99214 OFFICE O/P EST MOD 30 MIN: CPT | Performed by: NURSE PRACTITIONER

## 2019-08-05 PROCEDURE — 93880 EXTRACRANIAL BILAT STUDY: CPT | Performed by: SURGERY

## 2019-08-05 PROCEDURE — 93925 LOWER EXTREMITY STUDY: CPT | Performed by: SURGERY

## 2019-08-05 RX ORDER — FINASTERIDE 5 MG/1
5 TABLET, FILM COATED ORAL DAILY
COMMUNITY

## 2019-08-05 NOTE — PROGRESS NOTES
pulses are 2+ on the right side, and 1+ on the left side. Dorsalis pedis pulses are 0 on the right side, and 0 on the left side. Posterior tibial pulses are 2+ on the right side, and 0 on the left side. MONA'S 02/04/2019:  Right:  P.T.: 122 D.P.: 126 ARM BP: 118/68 P. I.: 1.03/1.07  Left:  P.T.: 80 D.P.: 80 ARM BP: 116/72 P.I.: .73/.68      MONA'S 08/2/2018:  Right:  P.T.: 160 D.P.: 162 ARM BP: 144/70 P. I.: 1.11/1.12  Left:  P.T.:102 D.P.: 100 ARM BP: 138/66 P. I.: 0.71/0.69  Patent right femoral PT in situ bypass graft      MONA'S 12/7/17:  Right:  P.T.: 158 D.P.: 144 ARM BP: 144/70 P. I.: 1.1/1.0  Left:  P.T.: 695 D.P.: 60 ARM BP: 139/74 P. I.: 0.75/0.42   Pulmonary/Chest: No respiratory distress. He has no wheezes. He has no rales. Abdominal: He exhibits no distension and no mass. There is no tenderness. There is no guarding. Musculoskeletal: He exhibits no edema or tenderness. Neurological: He has normal strength. No cranial nerve deficit or sensory deficit. Gait (use of cane or walker) abnormal.   Skin: Skin is warm, dry and intact. Vitals reviewed. Assessment:       Diagnosis   1. Bilateral carotid artery stenosis without cerebral infarction      * The patient has a 50-80% DILIP stenosis by velocity criteria; DILIP 71% by image. * The patient has a 50-52% LICA stenosis by velocity criteria. * The patient has not experienced any symptoms related to his carotid disease. * Follow up in 6 months with carotid doppler scan and office visit. 2. 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 Hyperlipidemia, unspecified hyperlipidemia type - stable, on simvastatin   4. Essential hypertension, benign - stable, on lisinopril      PATIENT EDUCATION focused on signs/symptoms of stroke:  - Watch for one eye going dark like a shade was pulled down. - Watch for one side of the body or face not functioning correctly.   - Difficulty with speech, such as slurring your words. - Difficulty finding the right words, such as calling an object by the wrong name when you know the real name. If you have any of these symptoms, do not call us or your family doctor. Call 911 and go immediately to the hospital.  You have a 4-6 hour window from the point when symptoms start to get to the hospital, get a CT scan done and initiate clot dissolving drugs. Per LE arterial scan today:  Right:  Right MONA 1.03 . This is consistent with no significant PAD at rest.  Elevated velocity of the right distal SFA at the distal end of the stent suggests >75% stenosis. Patent right proximal popliteal artery to distal PTA insitu bypass graft. Left:  Left MONA 0.74. This is consistent with moderate arterial insufficiency at rest.  Elevated velocity of the left mid SFA suggests >50% stenosis. No significant change when compared to the previous study of 2/4/19. PATIENT EDUCATION focused on elevating legs with the ankle at or above the level of the heart as needed to relieve leg pain and swelling. Patient to participate in exercise as tolerated with focus on the leg(s) including, daily walking, repetitive toe pointing, and calve muscle pumping/stretching as tolerated. Plan:          Return in about 6 months (around 2/5/2020) for ADS w/MONA's, CDS and office visit. Sheila Canela MA am scribing for and in the presence of Sami Steel CNP on this date of 08/05/19 at 12:23 PM    I Sami Steel CNP, personally performed the services described in this documentation as scribed by the Medical Assistant Timoteo Sigala in my presence and it is both accurate and complete.

## 2020-02-06 ENCOUNTER — OFFICE VISIT (OUTPATIENT)
Dept: SURGERY | Age: 85
End: 2020-02-06
Payer: MEDICARE

## 2020-02-06 ENCOUNTER — PROCEDURE VISIT (OUTPATIENT)
Dept: SURGERY | Age: 85
End: 2020-02-06
Payer: MEDICARE

## 2020-02-06 VITALS — DIASTOLIC BLOOD PRESSURE: 68 MMHG | SYSTOLIC BLOOD PRESSURE: 156 MMHG

## 2020-02-06 PROCEDURE — 99214 OFFICE O/P EST MOD 30 MIN: CPT | Performed by: NURSE PRACTITIONER

## 2020-02-06 PROCEDURE — 93925 LOWER EXTREMITY STUDY: CPT | Performed by: SURGERY

## 2020-02-06 PROCEDURE — 93880 EXTRACRANIAL BILAT STUDY: CPT | Performed by: SURGERY

## 2020-02-06 RX ORDER — FLUTICASONE PROPIONATE 50 MCG
2 SPRAY, SUSPENSION (ML) NASAL
COMMUNITY
Start: 2018-08-28 | End: 2020-02-06 | Stop reason: CLARIF

## 2020-02-06 RX ORDER — CLOPIDOGREL BISULFATE 75 MG/1
75 TABLET ORAL DAILY
COMMUNITY
Start: 2014-06-26

## 2020-02-06 RX ORDER — TAMSULOSIN HYDROCHLORIDE 0.4 MG/1
CAPSULE ORAL
COMMUNITY
Start: 2019-10-24 | End: 2020-02-06 | Stop reason: CLARIF

## 2020-02-06 RX ORDER — ATORVASTATIN CALCIUM 40 MG/1
40 TABLET, FILM COATED ORAL NIGHTLY
COMMUNITY
Start: 2019-09-05

## 2020-02-06 RX ORDER — FINASTERIDE 5 MG/1
1 TABLET, FILM COATED ORAL
COMMUNITY
Start: 2019-08-22 | End: 2020-02-06 | Stop reason: CLARIF

## 2020-02-06 RX ORDER — GABAPENTIN 100 MG/1
CAPSULE ORAL
COMMUNITY
Start: 2020-01-23 | End: 2022-09-14

## 2020-02-06 RX ORDER — RANITIDINE 300 MG/1
TABLET ORAL
COMMUNITY
Start: 2014-06-26 | End: 2020-02-06 | Stop reason: CLARIF

## 2020-02-06 RX ORDER — METOPROLOL SUCCINATE 50 MG/1
50 TABLET, EXTENDED RELEASE ORAL
COMMUNITY
Start: 2019-09-05

## 2020-02-06 RX ORDER — LISINOPRIL 10 MG/1
TABLET ORAL
COMMUNITY
Start: 2014-06-26 | End: 2020-02-06 | Stop reason: CLARIF

## 2020-02-06 RX ORDER — POLYETHYLENE GLYCOL 3350 17 G/17G
17 POWDER, FOR SOLUTION ORAL
COMMUNITY
End: 2020-02-06 | Stop reason: CLARIF

## 2020-02-06 NOTE — LETTER
[address] [facility]  [address] [facility]  Phone: [phone]  Fax: [phone] [facility]        February 6, 2020      [id number] [facility] [address] [facility] [address]  [address] [facility]     Patient: Montez Brito    MR Number: [id number]    YOB: 1930    Date of Visit: 02/06/2020        Dear Gonzales Distance:    Montez Brito, Dr. Yuly Pollock patient, was in the office today following his carotid duplex scan. My assessment is as follows:    Carotid artery stenosis, w/o mention of cerebral infarction   Current plans       * The patient has a 50-80% DILIP stenosis by velocity criteria; DILIP 65% by            image. * The patient has a 13-43% LICA stenosis by velocity criteria; LICA 86% by             image. * The patient has not experienced any symptoms related to his carotid disease. * Follow up in 6 months with carotid doppler scan and office visit. I will keep you posted regarding his progress.     Sincerely,      Maris Zuñiga CNP

## 2020-02-06 NOTE — PROGRESS NOTES
and numbness. All other systems reviewed and are negative. Allergies   Allergen Reactions    Penicillins      Chest pain       Prior to Visit Medications    Medication Sig Taking? Authorizing Provider   apixaban (ELIQUIS) 2.5 MG TABS tablet Take 1 tablet by mouth Yes Historical Provider, MD   atorvastatin (LIPITOR) 40 MG tablet Take 40 mg by mouth Yes Historical Provider, MD   clopidogrel (PLAVIX) 75 MG tablet Take 75 mg by mouth Yes Historical Provider, MD   metoprolol succinate (TOPROL XL) 50 MG extended release tablet Take 50 mg by mouth Yes Historical Provider, MD   gabapentin (NEURONTIN) 100 MG capsule TAKE ONE CAPSULE BY MOUTH TWICE A DAY Yes Historical Provider, MD   finasteride (PROSCAR) 5 MG tablet Take 5 mg by mouth daily Yes Historical Provider, MD   tamsulosin (FLOMAX) 0.4 MG capsule Take 0.4 mg by mouth daily Indications: 2 tabs at bed time  Yes Historical Provider, MD   polyethylene glycol (GLYCOLAX) powder Take 17 g by mouth daily  Yes Historical Provider, MD   fluticasone (FLONASE) 50 MCG/ACT nasal spray 2 sprays by Nasal route daily Yes Historical Provider, MD   Multiple Vitamins-Minerals (PRESERVISION AREDS 2 PO) Take  by mouth 2 times daily. Yes Historical Provider, MD   ranitidine (ZANTAC) 300 MG tablet   Take 300 mg by mouth nightly  Yes Historical Provider, MD   lisinopril (PRINIVIL;ZESTRIL) 10 MG tablet Take 10 mg by mouth daily. Yes Historical Provider, MD       History reviewed. Objective:   Physical Exam   Constitutional: He appears well-developed and well-nourished. thin   HENT:   Head: Normocephalic. Right Ear: Decreased hearing is noted. Left Ear: Decreased hearing is noted. Neck: Trachea normal. Neck supple. No JVD present. Carotid bruit is not present. No tracheal deviation present. Cardiovascular: Normal rate and normal heart sounds. An irregular rhythm present. Friction rub:     Pulses:       Carotid pulses are 2+ on the right side and 2+ on the left side. Radial pulses are 2+ on the right side and 2+ on the left side. Femoral pulses are 2+ on the right side and 2+ on the left side. Popliteal pulses are 2+ on the right side and 1+ on the left side. Dorsalis pedis pulses are 0 on the right side and 0 on the left side. Posterior tibial pulses are 0 on the right side and 0 on the left side. MONA'S TODAY:  Right:  P.T.: 164 D.P.: 158 ARM BP: 154            P.I.: 1.05/1.01  Left:  P.T.: 100 D. P.: 82 ARM BP: 156            P.I.: 0.64/0.53    MONA'S 02/04/2019:  Right:  P.T.: 122 D.P.: 126 ARM BP: 118/68 P. I.: 1.03/1.07  Left:  P.T.: 80 D.P.: 80 ARM BP: 116/72 P.I.: .73/.68      MONA'S 08/2/2018:  Right:  P.T.: 160 D.P.: 162 ARM BP: 144/70 P. I.: 1.11/1.12  Left:  P.T.:102 D.P.: 100 ARM BP: 138/66 P. I.: 0.71/0.69  Patent right femoral PT in situ bypass graft      MONA'S 12/7/17:  Right:  P.T.: 158 D.P.: 144 ARM BP: 144/70 P. I.: 1.1/1.0  Left:  P.T.: 514 D.P.: 60 ARM BP: 139/74 P. I.: 0.75/0.42   Pulmonary/Chest: No respiratory distress. He has no wheezes. He has no rales. Abdominal: He exhibits no distension and no mass. There is no abdominal tenderness. There is no guarding. Musculoskeletal:         General: No tenderness or edema. Neurological: He has normal strength. No cranial nerve deficit or sensory deficit. Gait (use of cane or walker) abnormal.   Skin: Skin is warm, dry and intact. Vitals reviewed. Assessment:       Diagnosis   1. Bilateral carotid artery stenosis without cerebral infarction      * The patient has a 50-80% DILIP stenosis by velocity criteria; DILIP 65% by image. * The patient has a 98-56% LICA stenosis by velocity criteria; LICA 71% by image. * The patient has not experienced any symptoms related to his carotid disease. * Follow up in 6 months with carotid doppler scan and office visit. 2. 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 Hyperlipidemia, unspecified hyperlipidemia type - stable, on simvastatin   4. Essential hypertension, benign - stable, on lisinopril      PATIENT EDUCATION focused on signs/symptoms of stroke:  - Watch for one eye going dark like a shade was pulled down. - Watch for one side of the body or face not functioning correctly. - Difficulty with speech, such as slurring your words. - Difficulty finding the right words, such as calling an object by the wrong name when you know the real name. If you have any of these symptoms, do not call us or your family doctor. Call 911 and go immediately to the hospital.  You have a 4-6 hour window from the point when symptoms start to get to the hospital, get a CT scan done and initiate clot dissolving drugs. Per LE arterial scan today:  Right:  Greater than 50% stenosis of the right distal SFA at the distal end of the stent. Patent right proximal popliteal artery to distal PTA insitu bypass graft. Right MONA 1.05 . This is consistent with no significant PAD at rest.    Left:  Greater than 75% stenosis of the left mid SFA. Left MONA 0.64. This is consistent with moderate arterial insufficiency at rest.      PATIENT EDUCATION focused on elevating legs with the ankle at or above the level of the heart as needed to relieve leg pain and swelling. Patient to participate in exercise as tolerated with focus on the leg(s) including, daily walking, repetitive toe pointing, and calve muscle pumping/stretching as tolerated. Plan:        Return in about 6 months (around 8/6/2020) for CDS, ADS with MONA's & Office Visit. Kim Norton MA, am scribing for and in the presence of Jose Copeland CNP on this date of 02/06/20 at 2:56 PM    I Jose Copeland CNP, personally performed the services described in this documentation as scribed by the Medical Assistant Jamie Skiff in my presence and it is both accurate and complete.

## 2020-02-06 NOTE — PATIENT INSTRUCTIONS
Return in about 6 months (around 8/6/2020) for CDS, ADS with MONA's & Office Visit. PATIENT EDUCATION focused on signs/symptoms of stroke:  - Watch for one eye going dark like a shade was pulled down. - Watch for one side of the body or face not functioning correctly. - Difficulty with speech, such as slurring your words. - Difficulty finding the right words, such as calling an object by the wrong name when you know the real name. If you have any of these symptoms, do not call us or your family doctor. Call 911 and go immediately to the hospital.  You have a 4-6 hour window from the point when symptoms start to get to the hospital, get a CT scan done and initiate clot dissolving drugs. PATIENT EDUCATION focused on elevating legs with the ankle at or above the level of the heart as needed to relieve leg pain and swelling. Patient to participate in exercise as tolerated with focus on the leg(s) including, daily walking, repetitive toe pointing, and calve muscle pumping/stretching as tolerated. PATIENT EDUCATION:  Contact our office should you begin to experience the following symptoms of the lower extremities:     Leg numbness/weakness   Coldness in lower leg or foot   Change in color of legs   Sores on toes, feet or legs that don't heal   Shiny skin on legs   No pulse or weak pulse in legs or feet    If pain can occur even when you're at rest or lying down. This type of pain is called ischemic rest pain. It can be strong enough to disrupt sleep. Stretching your legs or walking around may help to relieve the pain. Purchase over the counter support stockings to apply to your lower legs. Wear these on a daily basis, off every night.

## 2022-09-14 ENCOUNTER — APPOINTMENT (OUTPATIENT)
Dept: GENERAL RADIOLOGY | Age: 87
DRG: 871 | End: 2022-09-14
Payer: OTHER GOVERNMENT

## 2022-09-14 ENCOUNTER — APPOINTMENT (OUTPATIENT)
Dept: CT IMAGING | Age: 87
DRG: 871 | End: 2022-09-14
Payer: OTHER GOVERNMENT

## 2022-09-14 ENCOUNTER — HOSPITAL ENCOUNTER (INPATIENT)
Age: 87
LOS: 4 days | Discharge: HOME | DRG: 871 | End: 2022-09-18
Attending: INTERNAL MEDICINE | Admitting: INTERNAL MEDICINE
Payer: OTHER GOVERNMENT

## 2022-09-14 DIAGNOSIS — K52.9 PROCTOCOLITIS: Primary | ICD-10-CM
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
## 2022-09-14 DIAGNOSIS — A41.9 SEPSIS, DUE TO UNSPECIFIED ORGANISM, UNSPECIFIED WHETHER ACUTE ORGAN DYSFUNCTION PRESENT (HCC): ICD-10-CM

## 2022-09-14 DIAGNOSIS — R53.1 GENERAL WEAKNESS: ICD-10-CM

## 2022-09-14 DIAGNOSIS — T79.6XXA TRAUMATIC RHABDOMYOLYSIS, INITIAL ENCOUNTER (HCC): ICD-10-CM

## 2022-09-14 DIAGNOSIS — N30.01 ACUTE CYSTITIS WITH HEMATURIA: ICD-10-CM

## 2022-09-14 DIAGNOSIS — R77.8 ELEVATED TROPONIN: ICD-10-CM

## 2022-09-14 PROBLEM — R10.9 ABDOMINAL PAIN: Status: ACTIVE | Noted: 2022-09-14

## 2022-09-14 LAB
A/G RATIO: 1.3 (ref 1.1–2.2)
ALBUMIN SERPL-MCNC: 3.6 G/DL (ref 3.4–5)
ALP BLD-CCNC: 105 U/L (ref 40–129)
ALT SERPL-CCNC: 14 U/L (ref 10–40)
ANION GAP SERPL CALCULATED.3IONS-SCNC: 12 MMOL/L (ref 3–16)
APTT: 37.5 SEC (ref 23–34.3)
AST SERPL-CCNC: 33 U/L (ref 15–37)
BACTERIA: ABNORMAL /HPF
BASE EXCESS VENOUS: 1.7 MMOL/L
BASOPHILS ABSOLUTE: 0.1 K/UL (ref 0–0.2)
BASOPHILS RELATIVE PERCENT: 0.3 %
BILIRUB SERPL-MCNC: 1 MG/DL (ref 0–1)
BILIRUBIN URINE: NEGATIVE
BLOOD, URINE: ABNORMAL
BUN BLDV-MCNC: 18 MG/DL (ref 7–20)
CALCIUM SERPL-MCNC: 9.1 MG/DL (ref 8.3–10.6)
CARBOXYHEMOGLOBIN: 1.2 %
CHLORIDE BLD-SCNC: 101 MMOL/L (ref 99–110)
CLARITY: ABNORMAL
CO2: 23 MMOL/L (ref 21–32)
COLOR: ABNORMAL
COMMENT UA: ABNORMAL
CREAT SERPL-MCNC: 1.2 MG/DL (ref 0.8–1.3)
EKG ATRIAL RATE: 81 BPM
EKG DIAGNOSIS: NORMAL
EKG P AXIS: 84 DEGREES
EKG P-R INTERVAL: 168 MS
EKG Q-T INTERVAL: 356 MS
EKG QRS DURATION: 90 MS
EKG QTC CALCULATION (BAZETT): 413 MS
EKG R AXIS: 18 DEGREES
EKG T AXIS: 67 DEGREES
EKG VENTRICULAR RATE: 81 BPM
EOSINOPHILS ABSOLUTE: 0 K/UL (ref 0–0.6)
EOSINOPHILS RELATIVE PERCENT: 0 %
EPITHELIAL CELLS, UA: ABNORMAL /HPF (ref 0–5)
GFR AFRICAN AMERICAN: >60
GFR NON-AFRICAN AMERICAN: 57
GLUCOSE BLD-MCNC: 87 MG/DL (ref 70–99)
GLUCOSE URINE: NEGATIVE MG/DL
HCO3 VENOUS: 27 MMOL/L (ref 23–29)
HCT VFR BLD CALC: 36.9 % (ref 40.5–52.5)
HEMOGLOBIN: 12.6 G/DL (ref 13.5–17.5)
INR BLD: 2.08 (ref 0.87–1.14)
KETONES, URINE: ABNORMAL MG/DL
LACTIC ACID, SEPSIS: 1 MMOL/L (ref 0.4–1.9)
LACTIC ACID, SEPSIS: 2.5 MMOL/L (ref 0.4–1.9)
LEUKOCYTE ESTERASE, URINE: ABNORMAL
LYMPHOCYTES ABSOLUTE: 0.4 K/UL (ref 1–5.1)
LYMPHOCYTES RELATIVE PERCENT: 2.1 %
MCH RBC QN AUTO: 32.9 PG (ref 26–34)
MCHC RBC AUTO-ENTMCNC: 34.2 G/DL (ref 31–36)
MCV RBC AUTO: 96.2 FL (ref 80–100)
METHEMOGLOBIN VENOUS: 0.2 %
MICROSCOPIC EXAMINATION: YES
MONOCYTES ABSOLUTE: 1.5 K/UL (ref 0–1.3)
MONOCYTES RELATIVE PERCENT: 7.3 %
NEUTROPHILS ABSOLUTE: 18.7 K/UL (ref 1.7–7.7)
NEUTROPHILS RELATIVE PERCENT: 90.3 %
NITRITE, URINE: NEGATIVE
O2 SAT, VEN: 23 %
O2 THERAPY: NORMAL
PCO2, VEN: 45.9 MMHG (ref 40–50)
PDW BLD-RTO: 14.2 % (ref 12.4–15.4)
PH UA: 6 (ref 5–8)
PH VENOUS: 7.38 (ref 7.35–7.45)
PLATELET # BLD: 268 K/UL (ref 135–450)
PMV BLD AUTO: 9.4 FL (ref 5–10.5)
PO2, VEN: <30 MMHG
POTASSIUM REFLEX MAGNESIUM: 4.4 MMOL/L (ref 3.5–5.1)
PRO-BNP: 3554 PG/ML (ref 0–449)
PROTEIN UA: 100 MG/DL
PROTHROMBIN TIME: 23.4 SEC (ref 11.7–14.5)
RBC # BLD: 3.84 M/UL (ref 4.2–5.9)
RBC UA: >100 /HPF (ref 0–4)
SARS-COV-2, NAAT: NOT DETECTED
SODIUM BLD-SCNC: 136 MMOL/L (ref 136–145)
SPECIFIC GRAVITY UA: >=1.03 (ref 1–1.03)
TCO2 CALC VENOUS: 29 MMOL/L
TOTAL CK: 609 U/L (ref 39–308)
TOTAL CK: 830 U/L (ref 39–308)
TOTAL PROTEIN: 6.4 G/DL (ref 6.4–8.2)
TROPONIN: 0.02 NG/ML
URINE REFLEX TO CULTURE: ABNORMAL
URINE TYPE: ABNORMAL
UROBILINOGEN, URINE: 0.2 E.U./DL
WBC # BLD: 20.7 K/UL (ref 4–11)
WBC UA: ABNORMAL /HPF (ref 0–5)

## 2022-09-14 PROCEDURE — 85025 COMPLETE CBC W/AUTO DIFF WBC: CPT

## 2022-09-14 PROCEDURE — 96361 HYDRATE IV INFUSION ADD-ON: CPT

## 2022-09-14 PROCEDURE — 84484 ASSAY OF TROPONIN QUANT: CPT

## 2022-09-14 PROCEDURE — 73502 X-RAY EXAM HIP UNI 2-3 VIEWS: CPT

## 2022-09-14 PROCEDURE — 82803 BLOOD GASES ANY COMBINATION: CPT

## 2022-09-14 PROCEDURE — 99285 EMERGENCY DEPT VISIT HI MDM: CPT

## 2022-09-14 PROCEDURE — 2580000003 HC RX 258: Performed by: GENERAL ACUTE CARE HOSPITAL

## 2022-09-14 PROCEDURE — 93010 ELECTROCARDIOGRAM REPORT: CPT | Performed by: INTERNAL MEDICINE

## 2022-09-14 PROCEDURE — 81001 URINALYSIS AUTO W/SCOPE: CPT

## 2022-09-14 PROCEDURE — 83605 ASSAY OF LACTIC ACID: CPT

## 2022-09-14 PROCEDURE — 1200000000 HC SEMI PRIVATE

## 2022-09-14 PROCEDURE — 83880 ASSAY OF NATRIURETIC PEPTIDE: CPT

## 2022-09-14 PROCEDURE — 36415 COLL VENOUS BLD VENIPUNCTURE: CPT

## 2022-09-14 PROCEDURE — 2580000003 HC RX 258: Performed by: INTERNAL MEDICINE

## 2022-09-14 PROCEDURE — 71260 CT THORAX DX C+: CPT

## 2022-09-14 PROCEDURE — 6370000000 HC RX 637 (ALT 250 FOR IP): Performed by: GENERAL ACUTE CARE HOSPITAL

## 2022-09-14 PROCEDURE — 6360000004 HC RX CONTRAST MEDICATION: Performed by: GENERAL ACUTE CARE HOSPITAL

## 2022-09-14 PROCEDURE — 71045 X-RAY EXAM CHEST 1 VIEW: CPT

## 2022-09-14 PROCEDURE — 6370000000 HC RX 637 (ALT 250 FOR IP): Performed by: INTERNAL MEDICINE

## 2022-09-14 PROCEDURE — 6360000002 HC RX W HCPCS: Performed by: GENERAL ACUTE CARE HOSPITAL

## 2022-09-14 PROCEDURE — 85730 THROMBOPLASTIN TIME PARTIAL: CPT

## 2022-09-14 PROCEDURE — 87040 BLOOD CULTURE FOR BACTERIA: CPT

## 2022-09-14 PROCEDURE — 2500000003 HC RX 250 WO HCPCS: Performed by: GENERAL ACUTE CARE HOSPITAL

## 2022-09-14 PROCEDURE — 85610 PROTHROMBIN TIME: CPT

## 2022-09-14 PROCEDURE — 96360 HYDRATION IV INFUSION INIT: CPT

## 2022-09-14 PROCEDURE — 87635 SARS-COV-2 COVID-19 AMP PRB: CPT

## 2022-09-14 PROCEDURE — 80053 COMPREHEN METABOLIC PANEL: CPT

## 2022-09-14 PROCEDURE — 93005 ELECTROCARDIOGRAM TRACING: CPT | Performed by: GENERAL ACUTE CARE HOSPITAL

## 2022-09-14 PROCEDURE — 70450 CT HEAD/BRAIN W/O DYE: CPT

## 2022-09-14 PROCEDURE — 72125 CT NECK SPINE W/O DYE: CPT

## 2022-09-14 PROCEDURE — 82550 ASSAY OF CK (CPK): CPT

## 2022-09-14 RX ORDER — TAMSULOSIN HYDROCHLORIDE 0.4 MG/1
0.8 CAPSULE ORAL NIGHTLY
Status: DISCONTINUED | OUTPATIENT
Start: 2022-09-14 | End: 2022-09-18 | Stop reason: HOSPADM

## 2022-09-14 RX ORDER — METOPROLOL SUCCINATE 50 MG/1
50 TABLET, EXTENDED RELEASE ORAL DAILY
Status: DISCONTINUED | OUTPATIENT
Start: 2022-09-14 | End: 2022-09-18 | Stop reason: HOSPADM

## 2022-09-14 RX ORDER — CETIRIZINE HYDROCHLORIDE 10 MG/1
5 TABLET ORAL DAILY
Status: DISCONTINUED | OUTPATIENT
Start: 2022-09-15 | End: 2022-09-18 | Stop reason: HOSPADM

## 2022-09-14 RX ORDER — SODIUM CHLORIDE 0.9 % (FLUSH) 0.9 %
10 SYRINGE (ML) INJECTION PRN
Status: DISCONTINUED | OUTPATIENT
Start: 2022-09-14 | End: 2022-09-14

## 2022-09-14 RX ORDER — METRONIDAZOLE 500 MG/100ML
500 INJECTION, SOLUTION INTRAVENOUS ONCE
Status: COMPLETED | OUTPATIENT
Start: 2022-09-14 | End: 2022-09-14

## 2022-09-14 RX ORDER — FAMOTIDINE 20 MG/1
20 TABLET, FILM COATED ORAL NIGHTLY
Status: DISCONTINUED | OUTPATIENT
Start: 2022-09-14 | End: 2022-09-18 | Stop reason: HOSPADM

## 2022-09-14 RX ORDER — 0.9 % SODIUM CHLORIDE 0.9 %
1000 INTRAVENOUS SOLUTION INTRAVENOUS ONCE
Status: COMPLETED | OUTPATIENT
Start: 2022-09-14 | End: 2022-09-14

## 2022-09-14 RX ORDER — SODIUM CHLORIDE 0.9 % (FLUSH) 0.9 %
5-40 SYRINGE (ML) INJECTION PRN
Status: DISCONTINUED | OUTPATIENT
Start: 2022-09-14 | End: 2022-09-18 | Stop reason: HOSPADM

## 2022-09-14 RX ORDER — ACETAMINOPHEN 650 MG/1
650 SUPPOSITORY RECTAL EVERY 6 HOURS PRN
Status: DISCONTINUED | OUTPATIENT
Start: 2022-09-14 | End: 2022-09-18 | Stop reason: HOSPADM

## 2022-09-14 RX ORDER — LISINOPRIL 10 MG/1
10 TABLET ORAL DAILY
Status: DISCONTINUED | OUTPATIENT
Start: 2022-09-15 | End: 2022-09-18 | Stop reason: HOSPADM

## 2022-09-14 RX ORDER — FAMOTIDINE 20 MG/1
20 TABLET, FILM COATED ORAL DAILY
COMMUNITY

## 2022-09-14 RX ORDER — SODIUM CHLORIDE 9 MG/ML
INJECTION, SOLUTION INTRAVENOUS PRN
Status: DISCONTINUED | OUTPATIENT
Start: 2022-09-14 | End: 2022-09-18 | Stop reason: HOSPADM

## 2022-09-14 RX ORDER — POTASSIUM CHLORIDE 20 MEQ/1
40 TABLET, EXTENDED RELEASE ORAL PRN
Status: DISCONTINUED | OUTPATIENT
Start: 2022-09-14 | End: 2022-09-18 | Stop reason: HOSPADM

## 2022-09-14 RX ORDER — METRONIDAZOLE 500 MG/100ML
500 INJECTION, SOLUTION INTRAVENOUS EVERY 8 HOURS
Status: DISCONTINUED | OUTPATIENT
Start: 2022-09-15 | End: 2022-09-18 | Stop reason: HOSPADM

## 2022-09-14 RX ORDER — PROMETHAZINE HYDROCHLORIDE 25 MG/1
12.5 TABLET ORAL EVERY 6 HOURS PRN
Status: DISCONTINUED | OUTPATIENT
Start: 2022-09-14 | End: 2022-09-18 | Stop reason: HOSPADM

## 2022-09-14 RX ORDER — POTASSIUM CHLORIDE 7.45 MG/ML
10 INJECTION INTRAVENOUS PRN
Status: DISCONTINUED | OUTPATIENT
Start: 2022-09-14 | End: 2022-09-18 | Stop reason: HOSPADM

## 2022-09-14 RX ORDER — SODIUM CHLORIDE 9 MG/ML
INJECTION, SOLUTION INTRAVENOUS PRN
Status: DISCONTINUED | OUTPATIENT
Start: 2022-09-14 | End: 2022-09-14

## 2022-09-14 RX ORDER — SODIUM CHLORIDE 0.9 % (FLUSH) 0.9 %
10 SYRINGE (ML) INJECTION EVERY 12 HOURS SCHEDULED
Status: DISCONTINUED | OUTPATIENT
Start: 2022-09-14 | End: 2022-09-14

## 2022-09-14 RX ORDER — SODIUM CHLORIDE 0.9 % (FLUSH) 0.9 %
5-40 SYRINGE (ML) INJECTION EVERY 12 HOURS SCHEDULED
Status: DISCONTINUED | OUTPATIENT
Start: 2022-09-14 | End: 2022-09-18 | Stop reason: HOSPADM

## 2022-09-14 RX ORDER — NIFEDIPINE 30 MG/1
30 TABLET, FILM COATED, EXTENDED RELEASE ORAL DAILY
COMMUNITY

## 2022-09-14 RX ORDER — NIFEDIPINE 30 MG/1
30 TABLET, EXTENDED RELEASE ORAL DAILY
Status: DISCONTINUED | OUTPATIENT
Start: 2022-09-14 | End: 2022-09-18 | Stop reason: HOSPADM

## 2022-09-14 RX ORDER — ATORVASTATIN CALCIUM 40 MG/1
40 TABLET, FILM COATED ORAL NIGHTLY
Status: DISCONTINUED | OUTPATIENT
Start: 2022-09-14 | End: 2022-09-18 | Stop reason: HOSPADM

## 2022-09-14 RX ORDER — ACETAMINOPHEN 325 MG/1
650 TABLET ORAL EVERY 6 HOURS PRN
Status: DISCONTINUED | OUTPATIENT
Start: 2022-09-14 | End: 2022-09-18 | Stop reason: HOSPADM

## 2022-09-14 RX ORDER — LEVOCETIRIZINE DIHYDROCHLORIDE 5 MG/1
5 TABLET, FILM COATED ORAL NIGHTLY
Status: DISCONTINUED | OUTPATIENT
Start: 2022-09-14 | End: 2022-09-14

## 2022-09-14 RX ORDER — ONDANSETRON 2 MG/ML
4 INJECTION INTRAMUSCULAR; INTRAVENOUS EVERY 6 HOURS PRN
Status: DISCONTINUED | OUTPATIENT
Start: 2022-09-14 | End: 2022-09-18 | Stop reason: HOSPADM

## 2022-09-14 RX ORDER — CIPROFLOXACIN 2 MG/ML
400 INJECTION, SOLUTION INTRAVENOUS EVERY 12 HOURS
Status: DISCONTINUED | OUTPATIENT
Start: 2022-09-15 | End: 2022-09-18 | Stop reason: HOSPADM

## 2022-09-14 RX ORDER — FINASTERIDE 5 MG/1
5 TABLET, FILM COATED ORAL DAILY
Status: DISCONTINUED | OUTPATIENT
Start: 2022-09-15 | End: 2022-09-18 | Stop reason: HOSPADM

## 2022-09-14 RX ORDER — CIPROFLOXACIN 2 MG/ML
400 INJECTION, SOLUTION INTRAVENOUS ONCE
Status: COMPLETED | OUTPATIENT
Start: 2022-09-14 | End: 2022-09-14

## 2022-09-14 RX ORDER — ACETAMINOPHEN 500 MG
1000 TABLET ORAL ONCE
Status: COMPLETED | OUTPATIENT
Start: 2022-09-14 | End: 2022-09-14

## 2022-09-14 RX ORDER — FLUTICASONE PROPIONATE 50 MCG
2 SPRAY, SUSPENSION (ML) NASAL DAILY
Status: DISCONTINUED | OUTPATIENT
Start: 2022-09-15 | End: 2022-09-18 | Stop reason: HOSPADM

## 2022-09-14 RX ORDER — GABAPENTIN 100 MG/1
100 CAPSULE ORAL 2 TIMES DAILY
COMMUNITY

## 2022-09-14 RX ORDER — POTASSIUM CHLORIDE 7.45 MG/ML
10 INJECTION INTRAVENOUS PRN
Status: DISCONTINUED | OUTPATIENT
Start: 2022-09-14 | End: 2022-09-14

## 2022-09-14 RX ORDER — LEVOCETIRIZINE DIHYDROCHLORIDE 5 MG/1
5 TABLET, FILM COATED ORAL NIGHTLY
COMMUNITY

## 2022-09-14 RX ORDER — SODIUM CHLORIDE 9 MG/ML
INJECTION, SOLUTION INTRAVENOUS CONTINUOUS
Status: DISCONTINUED | OUTPATIENT
Start: 2022-09-14 | End: 2022-09-17

## 2022-09-14 RX ORDER — MAGNESIUM SULFATE IN WATER 40 MG/ML
2000 INJECTION, SOLUTION INTRAVENOUS PRN
Status: DISCONTINUED | OUTPATIENT
Start: 2022-09-14 | End: 2022-09-18 | Stop reason: HOSPADM

## 2022-09-14 RX ORDER — GABAPENTIN 100 MG/1
100 CAPSULE ORAL 2 TIMES DAILY
Status: DISCONTINUED | OUTPATIENT
Start: 2022-09-14 | End: 2022-09-18 | Stop reason: HOSPADM

## 2022-09-14 RX ADMIN — SODIUM CHLORIDE: 9 INJECTION, SOLUTION INTRAVENOUS at 21:38

## 2022-09-14 RX ADMIN — ATORVASTATIN CALCIUM 40 MG: 40 TABLET, FILM COATED ORAL at 21:24

## 2022-09-14 RX ADMIN — CIPROFLOXACIN 400 MG: 2 INJECTION, SOLUTION INTRAVENOUS at 18:29

## 2022-09-14 RX ADMIN — METRONIDAZOLE 500 MG: 500 INJECTION, SOLUTION INTRAVENOUS at 18:29

## 2022-09-14 RX ADMIN — FAMOTIDINE 20 MG: 20 TABLET, FILM COATED ORAL at 21:24

## 2022-09-14 RX ADMIN — TAMSULOSIN HYDROCHLORIDE 0.8 MG: 0.4 CAPSULE ORAL at 21:24

## 2022-09-14 RX ADMIN — ACETAMINOPHEN 1000 MG: 500 TABLET ORAL at 13:11

## 2022-09-14 RX ADMIN — Medication 10 ML: at 21:26

## 2022-09-14 RX ADMIN — METOPROLOL SUCCINATE 50 MG: 50 TABLET, EXTENDED RELEASE ORAL at 21:24

## 2022-09-14 RX ADMIN — GABAPENTIN 100 MG: 100 CAPSULE ORAL at 21:23

## 2022-09-14 RX ADMIN — NIFEDIPINE 30 MG: 30 TABLET, EXTENDED RELEASE ORAL at 21:24

## 2022-09-14 RX ADMIN — SODIUM CHLORIDE 1000 ML: 9 INJECTION, SOLUTION INTRAVENOUS at 13:11

## 2022-09-14 RX ADMIN — ACETAMINOPHEN 650 MG: 325 TABLET ORAL at 21:24

## 2022-09-14 RX ADMIN — IOPAMIDOL 75 ML: 755 INJECTION, SOLUTION INTRAVENOUS at 15:23

## 2022-09-14 RX ADMIN — SODIUM CHLORIDE 1000 ML: 9 INJECTION, SOLUTION INTRAVENOUS at 20:13

## 2022-09-14 ASSESSMENT — PAIN DESCRIPTION - DESCRIPTORS: DESCRIPTORS: DISCOMFORT

## 2022-09-14 ASSESSMENT — ENCOUNTER SYMPTOMS
SHORTNESS OF BREATH: 0
NAUSEA: 0
SORE THROAT: 0
CHEST TIGHTNESS: 0
VOICE CHANGE: 0
BACK PAIN: 0
COUGH: 0
VOMITING: 0
WHEEZING: 0
ABDOMINAL PAIN: 0

## 2022-09-14 ASSESSMENT — PAIN DESCRIPTION - ORIENTATION: ORIENTATION: RIGHT

## 2022-09-14 ASSESSMENT — PAIN DESCRIPTION - LOCATION: LOCATION: SHOULDER

## 2022-09-14 ASSESSMENT — PAIN DESCRIPTION - PAIN TYPE: TYPE: ACUTE PAIN

## 2022-09-14 ASSESSMENT — PAIN DESCRIPTION - ONSET: ONSET: GRADUAL

## 2022-09-14 ASSESSMENT — PAIN - FUNCTIONAL ASSESSMENT: PAIN_FUNCTIONAL_ASSESSMENT: NONE - DENIES PAIN

## 2022-09-14 ASSESSMENT — PAIN SCALES - GENERAL
PAINLEVEL_OUTOF10: 0
PAINLEVEL_OUTOF10: 3

## 2022-09-14 ASSESSMENT — PAIN DESCRIPTION - FREQUENCY: FREQUENCY: INTERMITTENT

## 2022-09-14 NOTE — PROGRESS NOTES
Medication Reconciliation    List of medications patient is currently taking is in progress. Source of information: 1. Conversation with patient's caregiver at bedside                                      2. EPIC records      Allergies  Penicillins     Notes regarding home medications:   1. Patient's daughter helps with medications, unsure if patient takes Eliquis 5mg or 2.5mg BID. She will check when she can and call us back  2.  Patient can't remember if he took medications last night or this morning      Jomar Kirk [facility]   9/14/2022  2:06 PM

## 2022-09-14 NOTE — H&P
Hospital Medicine History & Physical      PCP: Bartolome Sloan    Date of Admission: 9/14/2022    Chief Complaint:  Abd pain/fall    History Of Present Illness:    Patient is a 60-year-old male who presented to hospital for generalized weakness, on further evaluation patient mentions he has been feeling weak and had a fall yesterday, patient denies nausea diarrhea constipation dysuria, he also denied dizziness blurred vision numbness tingling sensation before the fall. On further evaluation in the emergency department he was found to be having low-grade fever as well as blood in the urine. 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     Unspecified hearing loss     Unspecified pulmonary tuberculosis, confirmation unspecified        Past Surgical History:          Procedure Laterality Date    ANGIOPLASTY      STENT/PTLA    BACK SURGERY  May 2015    lumbar fusion    CYSTOSCOPY  01/19/2019    CYSTOSCOPY EVACUATION OF CLOTS, fulgeration of prostate    CYSTOSCOPY N/A 1/19/2019    CYSTOSCOPY EVACUATION OF CLOTS, fulgeration of prostate performed by Len Jerez DO at [address] Right 8/3/10    Right above knee popliteal to posterior tibial artery bypass graft with non reversed translocated great saphenous vein    TUMOR REMOVAL      chest area       Medications Prior to Admission:      Prior to Admission medications    Medication Sig Start Date End Date Taking?  Authorizing Provider   gabapentin (NEURONTIN) 100 MG capsule Take 100 mg by mouth 2 times daily. Yes Historical Provider, MD   NIFEdipine (ADALAT CC) 30 MG extended release tablet Take 30 mg by mouth daily   Yes Historical Provider, MD   famotidine (PEPCID) 20 MG tablet Take 20 mg by mouth daily   Yes Historical Provider, MD   levocetirizine (XYZAL) 5 MG tablet Take 5 mg by mouth nightly   Yes Historical Provider, MD   apixaban (ELIQUIS) 2.5 MG TABS tablet Take 2.5 mg by mouth 2 times daily 9/5/19   Historical Provider, MD   atorvastatin (LIPITOR) 40 MG tablet Take 40 mg by mouth at bedtime 9/5/19   Historical Provider, MD   clopidogrel (PLAVIX) 75 MG tablet Take 75 mg by mouth daily 6/26/14   Historical Provider, MD   metoprolol succinate (TOPROL XL) 50 MG extended release tablet Take 50 mg by mouth 9/5/19   Historical Provider, MD   finasteride (PROSCAR) 5 MG tablet Take 5 mg by mouth daily    Historical Provider, MD   tamsulosin (FLOMAX) 0.4 MG capsule Take 0.8 mg by mouth at bedtime 1/15/19   Historical Provider, MD   polyethylene glycol (GLYCOLAX) powder Take 17 g by mouth daily as needed    Historical Provider, MD   fluticasone (FLONASE) 50 MCG/ACT nasal spray 2 sprays by Nasal route daily 8/28/18   Historical Provider, MD   Multiple Vitamins-Minerals (PRESERVISION AREDS 2 PO) Take 1 tablet by mouth 2 times daily    Historical Provider, MD   lisinopril (PRINIVIL;ZESTRIL) 10 MG tablet Take 10 mg by mouth daily. Historical Provider, MD       Allergies:  Penicillins    Social History:      TOBACCO:   reports that he quit smoking about 60 years ago. He has never used smokeless tobacco.  ETOH:   has no history on file for alcohol use. Family History:       Reviewed in detail and non contributory      History reviewed. No pertinent family history. REVIEW OF SYSTEMS:   Pertinent positives as noted in the HPI. All other systems reviewed and negative.     PHYSICAL EXAM PERFORMED:    /60   Pulse 78   Temp 99.1 °F (37.3 °C) (Oral)   Resp 23   SpO2 96% General appearance: NAD  HEENT:  Normal cephalic, atraumatic without obvious deformity. Conjunctivae/corneas clear. Neck: Supple, with full range of motion. No cervical lymphadenopathy  Respiratory:  Normal respiratory effort. Clear to auscultation, bilaterally without Rales/Wheezes/Rhonchi. Cardiovascular:  Regular rate and rhythm with normal S1/S2 without murmurs, rubs or gallops. Abdomen: Soft, non-tender, non-distended, normal bowel sounds. Musculoskeletal:  No edema noted bilaterally. No tenderness on palpation   Skin: no rash visible  Neurologic:  Neurologically intact without any focal sensory/motor deficits. grossly non-focal.  Psychiatric:  Alert and oriented, normal mood  Peripheral Pulses: +2 palpable, equal bilaterally       Labs:     Recent Labs     09/14/22  1248   WBC 20.7*   HGB 12.6*   HCT 36.9*        Recent Labs     09/14/22  1248      K 4.4      CO2 23   BUN 18   CREATININE 1.2   CALCIUM 9.1     Recent Labs     09/14/22  1248   AST 33   ALT 14   BILITOT 1.0   ALKPHOS 105     Recent Labs     09/14/22  1248   INR 2.08*     Recent Labs     09/14/22  1248   CKTOTAL 830*   TROPONINI 0.02*       Urinalysis:      Lab Results   Component Value Date/Time    NITRU Negative 09/14/2022 02:36 PM    WBCUA see below 09/14/2022 02:36 PM    BACTERIA Rare 09/14/2022 02:36 PM    RBCUA >100 09/14/2022 02:36 PM    BLOODU LARGE 09/14/2022 02:36 PM    SPECGRAV >=1.030 09/14/2022 02:36 PM    GLUCOSEU Negative 09/14/2022 02:36 PM       Radiology:       CT CHEST ABDOMEN PELVIS W CONTRAST   Final Result   1. No acute findings in the chest.  Dependent opacities in the lower lobes   are believed to represent atelectasis. 2. Thickening of the cecum and of the rectum favored to represent   proctocolitis. Underlying malignancy in these areas cannot be excluded. 3. Severe prostatomegaly and thickening of the bladder which could reflect   associated hypertrophy versus cystitis.    4. Mass in the mediastinum posterior to the distal aspect of the trachea   measuring up to 5.2 cm. The mass is relatively low attenuation. Given its   location and attenuation, a bronchogenic cyst is favored over other   possibility such as lymphadenopathy. A mass arising from the esophagus is   considered significantly less likely. The mass causes mass effect with   narrowing of the trachea and the right bronchus. 5. Colonic diverticulosis. XR HIP 2-3 VW W PELVIS RIGHT   Final Result   No acute abnormality of the hip. If there is persistent clinical suspicion   for radiographically occult fracture and the patient is unable to bear   weight, recommend MRI. XR CHEST PORTABLE   Final Result   Airways disease. CT HEAD WO CONTRAST   Final Result   No acute intracranial abnormality. CT CERVICAL SPINE WO CONTRAST   Final Result   1. No evidence cervical spine fracture. 2. Prominent multilevel spinal degenerative changes with areas of central   canal and osseous neural foraminal narrowing as described. 3. Mild anterolisthesis at the C7-T1 and T1-T2 levels which is likely   degenerative. Active Hospital Problems    Diagnosis Date Noted    Abdominal pain [R10.9] 09/14/2022     Priority: Medium         Patient is a 63-year-old male who presented to hospital for generalized weakness, on further evaluation patient mentions he has been feeling weak and had a fall yesterday, patient denies nausea diarrhea constipation dysuria, he also denied dizziness blurred vision numbness tingling sensation before the fall. On further evaluation in the emergency department he was found to be having low-grade fever as well as blood in the urine.     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  Generalized weakness  Fall  Traumatic rhabdomyolysis  Elevated troponin  Sepsis POA, Low-grade fever on admission, leukocytosis likely secondary to #1    Plan  Start ciprofloxacin, Flagyl, clear liquid diet for now, consult GI  IV fluids  Consult urology, s/p Daniel catheter, gross hematuria  Consult cardiothoracic surgery for mediastinal mass  Monitor CK level  Morphine cardiac telemetry  DVT prophylaxis-SCDs only for now  Diet: ADULT DIET; Clear Liquid  Code Status: Prior    PT/OT Eval Status: ordered    Dispo - pending clinical improvement       Caryl Howell MD    The note was completed using EMR and Dragon dictation system. Every effort was made to ensure accuracy; however, inadvertent computerized transcription errors may be present. Thank you Janina Azevedo for the opportunity to be involved in this patient's care. If you have any questions or concerns please feel free to contact me at [phone].     Caryl Howell MD

## 2022-09-14 NOTE — ED PROVIDER NOTES
[address] [facility]        Pt Name: Thomas Doran  MRN: [id number]  [place] 8/31/1930  Date of evaluation: 9/14/2022  Provider: FABY Taylor CNP  PCP: Saad Krishna  Note Started: 5:20 PM EDT       DAVID. I have evaluated this patient. My supervising physician was available for consultation. CHIEF COMPLAINT       Chief Complaint   Patient presents with    Fall     Generalized weakness, patient fell last night around 5. Someone helped him up around 10pm. Pt with increased weakness over the day yesterday when with daughter. Febrile. Positive for blood thinners. Unsure if loss of consciousness. And unsure if he hit his head. HISTORY OF PRESENT ILLNESS   (Location, Timing/Onset, Context/Setting, Quality, Duration, Modifying Factors, Severity, Associated Signs and Symptoms)  Note limiting factors. Chief Complaint: Fall, generalized weakness    Thomas Doran is a 80 y.o. male who presents to the emergency department today from independent living at local [facility] for evaluation of generalized weakness. Patient apparently sustained a fall yesterday evening at approximately 5 PM.  He states that he did not hit his head or lose consciousness. He does take Plavix. Patient was on the ground for approximately 5 hours prior to being assisted up. Patient reports generalized weakness and states that he is unable to ambulate because of this. There has been no recent travel or known sick contacts. There has been no nasal congestion or cough. Daughter states that patient was experiencing chills yesterday and this morning. His temperature was not taken at the time. There has been no chest pain or trouble breathing. There has been no nausea, vomiting, or diarrhea. Patient denies other symptoms. There has been no headache, dizziness, blurred vision. Patient denies any unilateral extremity weakness.     Nursing Notes were all reviewed and agreed with or any disagreements were addressed in the HPI. REVIEW OF SYSTEMS    (2-9 systems for level 4, 10 or more for level 5)     Review of Systems   Constitutional:  Positive for chills and fatigue. Negative for unexpected weight change. HENT:  Negative for congestion, sore throat and voice change. Eyes:  Negative for visual disturbance. Respiratory:  Negative for cough, chest tightness, shortness of breath and wheezing. Cardiovascular:  Negative for chest pain, palpitations and leg swelling. Gastrointestinal:  Negative for abdominal pain, nausea and vomiting. Endocrine: Negative for polydipsia and polyuria. Genitourinary:  Negative for difficulty urinating, dysuria and flank pain. Musculoskeletal:  Positive for gait problem. Negative for back pain, neck pain and neck stiffness. Skin:  Negative for rash and wound. Allergic/Immunologic: Negative for immunocompromised state. Neurological:  Negative for weakness and headaches. Hematological:  Does not bruise/bleed easily. Psychiatric/Behavioral:  Negative for sleep disturbance and suicidal ideas. Positives and Pertinent negatives as per HPI. Except as noted above in the ROS, all other systems were reviewed and negative.        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     Unspecified hearing loss     Unspecified pulmonary tuberculosis, confirmation unspecified          SURGICAL HISTORY     Past Surgical History:   Procedure Laterality Date    ANGIOPLASTY      STENT/PTLA    BACK SURGERY  May 2015    lumbar fusion    CYSTOSCOPY  01/19/2019    CYSTOSCOPY EVACUATION OF CLOTS, fulgeration of prostate    CYSTOSCOPY N/A 2019    CYSTOSCOPY EVACUATION OF CLOTS, fulgeration of prostate performed by Dimitry Finn DO at [address] GRAFT Right 8/3/10    Right above knee popliteal to posterior tibial artery bypass graft with non reversed translocated great saphenous vein    TUMOR REMOVAL      chest area         CURRENTMEDICATIONS       Previous Medications    APIXABAN (ELIQUIS) 2.5 MG TABS TABLET    Take 2.5 mg by mouth 2 times daily    ATORVASTATIN (LIPITOR) 40 MG TABLET    Take 40 mg by mouth at bedtime    CLOPIDOGREL (PLAVIX) 75 MG TABLET    Take 75 mg by mouth daily    FAMOTIDINE (PEPCID) 20 MG TABLET    Take 20 mg by mouth daily    FINASTERIDE (PROSCAR) 5 MG TABLET    Take 5 mg by mouth daily    FLUTICASONE (FLONASE) 50 MCG/ACT NASAL SPRAY    2 sprays by Nasal route daily    GABAPENTIN (NEURONTIN) 100 MG CAPSULE    Take 100 mg by mouth 2 times daily. LEVOCETIRIZINE (XYZAL) 5 MG TABLET    Take 5 mg by mouth nightly    LISINOPRIL (PRINIVIL;ZESTRIL) 10 MG TABLET    Take 10 mg by mouth daily. METOPROLOL SUCCINATE (TOPROL XL) 50 MG EXTENDED RELEASE TABLET    Take 50 mg by mouth    MULTIPLE VITAMINS-MINERALS (PRESERVISION AREDS 2 PO)    Take 1 tablet by mouth 2 times daily    NIFEDIPINE (ADALAT CC) 30 MG EXTENDED RELEASE TABLET    Take 30 mg by mouth daily    POLYETHYLENE GLYCOL (GLYCOLAX) POWDER    Take 17 g by mouth daily as needed    TAMSULOSIN (FLOMAX) 0.4 MG CAPSULE    Take 0.8 mg by mouth at bedtime         ALLERGIES     Penicillins    FAMILYHISTORY     History reviewed. No pertinent family history.        SOCIAL HISTORY       Social History     Tobacco Use    Smoking status: Former     Types: Cigarettes     Quit date: 1962     Years since quittin.2    Smokeless tobacco: Never   Vaping Use    Vaping Use: Never used   Substance Use Topics    Drug use: No       SCREENINGS    Paul Coma Scale  Eye Opening: Spontaneous  Best Verbal Response: Confused  Best Motor Response: Obeys commands  [place]en Coma Scale Score: 14        PHYSICAL EXAM    (up to 7 for level 4, 8 or more for level 5)     ED Triage Vitals [09/14/22 1153]   BP Temp Temp Source Heart Rate Resp SpO2 Height Weight   (!) 142/58 100.3 °F (37.9 °C) Oral 86 20 98 % -- --       Physical Exam  Vitals and nursing note reviewed. Constitutional:       General: He is not in acute distress. Appearance: Normal appearance. He is ill-appearing. Comments: Appears chronically ill   HENT:      Head: Normocephalic and atraumatic. Right Ear: External ear normal.      Left Ear: External ear normal.      Nose: Nose normal.      Mouth/Throat:      Mouth: Mucous membranes are dry. Pharynx: Oropharynx is clear. Eyes:      General:         Right eye: No discharge. Left eye: No discharge. Extraocular Movements: Extraocular movements intact. Conjunctiva/sclera: Conjunctivae normal.      Pupils: Pupils are equal, round, and reactive to light. Cardiovascular:      Rate and Rhythm: Normal rate and regular rhythm. Pulses: Normal pulses. Heart sounds: Normal heart sounds. Pulmonary:      Effort: Pulmonary effort is normal. No respiratory distress. Breath sounds: Normal breath sounds. Abdominal:      General: Bowel sounds are normal.      Palpations: Abdomen is soft. Tenderness: There is no abdominal tenderness. There is no right CVA tenderness, left CVA tenderness or guarding. Musculoskeletal:         General: Normal range of motion. Cervical back: Normal range of motion and neck supple. No rigidity or tenderness. Right lower leg: No edema. Left lower leg: No edema. Skin:     General: Skin is warm and dry. Capillary Refill: Capillary refill takes less than 2 seconds. Neurological:      General: No focal deficit present. Mental Status: He is alert and oriented to person, place, and time.    Psychiatric: Attention and Perception: Attention and perception normal.         Mood and Affect: Mood normal.         Speech: Speech normal.         Behavior: Behavior is slowed. Behavior is cooperative. Thought Content: Thought content normal.         Cognition and Memory: Cognition normal.         Judgment: Judgment normal.       DIAGNOSTIC RESULTS   LABS:    Labs Reviewed   LACTATE, SEPSIS - Abnormal; Notable for the following components:       Result Value    Lactic Acid, Sepsis 2.5 (*)     All other components within normal limits   CBC WITH AUTO DIFFERENTIAL - Abnormal; Notable for the following components:    WBC 20.7 (*)     RBC 3.84 (*)     Hemoglobin 12.6 (*)     Hematocrit 36.9 (*)     Neutrophils Absolute 18.7 (*)     Lymphocytes Absolute 0.4 (*)     Monocytes Absolute 1.5 (*)     All other components within normal limits   COMPREHENSIVE METABOLIC PANEL W/ REFLEX TO MG FOR LOW K - Abnormal; Notable for the following components:    GFR Non- 57 (*)     All other components within normal limits   TROPONIN - Abnormal; Notable for the following components:    Troponin 0.02 (*)     All other components within normal limits   BRAIN NATRIURETIC PEPTIDE - Abnormal; Notable for the following components:    Pro-BNP 3,554 (*)     All other components within normal limits   PROTIME-INR - Abnormal; Notable for the following components:    Protime 23.4 (*)     INR 2.08 (*)     All other components within normal limits   APTT - Abnormal; Notable for the following components:    aPTT 37.5 (*)     All other components within normal limits   URINALYSIS WITH REFLEX TO CULTURE - Abnormal; Notable for the following components:    Color, UA RED (*)     Clarity, UA TURBID (*)     Ketones, Urine TRACE (*)     Blood, Urine LARGE (*)     Protein,  (*)     Leukocyte Esterase, Urine MODERATE (*)     All other components within normal limits   CK - Abnormal; Notable for the following components:     Total  (*) All other components within normal limits   MICROSCOPIC URINALYSIS - Abnormal; Notable for the following components:    WBC, UA see below (*)     RBC, UA >100 (*)     Bacteria, UA Rare (*)     All other components within normal limits   COVID-19, RAPID   CULTURE, BLOOD 1   CULTURE, BLOOD 2   LACTATE, SEPSIS   BLOOD GAS, VENOUS       When ordered only abnormal lab results are displayed. All other labs were within normal range or not returned as of this dictation. EKG: When ordered, EKG's are interpreted by the Emergency Department Physician in the absence of a cardiologist.  Please see their note for interpretation of EKG. RADIOLOGY:   Non-plain film images such as CT, Ultrasound and MRI are read by the radiologist. Plain radiographic images are visualized and preliminarily interpreted by the ED Provider with the below findings:        Interpretation per the Radiologist below, if available at the time of this note:    CT CHEST [address]   Final Result   1. No acute findings in the chest.  Dependent opacities in the lower lobes   are believed to represent atelectasis. 2. Thickening of the cecum and of the rectum favored to represent   proctocolitis. Underlying malignancy in these areas cannot be excluded. 3. Severe prostatomegaly and thickening of the bladder which could reflect   associated hypertrophy versus cystitis. 4. Mass in the mediastinum posterior to the distal aspect of the trachea   measuring up to 5.2 cm. The mass is relatively low attenuation. Given its   location and attenuation, a bronchogenic cyst is favored over other   possibility such as lymphadenopathy. A mass arising from the esophagus is   considered significantly less likely. The mass causes mass effect with   narrowing of the trachea and the right bronchus. 5. Colonic diverticulosis. XR HIP 2-3 VW W PELVIS RIGHT   Final Result   No acute abnormality of the hip.   If there is persistent clinical suspicion   for radiographically occult fracture and the patient is unable to bear   weight, recommend MRI. XR CHEST PORTABLE   Final Result   Airways disease. CT HEAD WO CONTRAST   Final Result   No acute intracranial abnormality. CT CERVICAL SPINE WO CONTRAST   Final Result   1. No evidence cervical spine fracture. 2. Prominent multilevel spinal degenerative changes with areas of central   canal and osseous neural foraminal narrowing as described. 3. Mild anterolisthesis at the C7-T1 and T1-T2 levels which is likely   degenerative. CT HEAD WO CONTRAST    Result Date: 9/14/2022  EXAMINATION: CT OF THE HEAD WITHOUT CONTRAST  9/14/2022 12:17 pm TECHNIQUE: CT of the head was performed without the administration of intravenous contrast. Automated exposure control, iterative reconstruction, and/or weight based adjustment of the mA/kV was utilized to reduce the radiation dose to as low as reasonably achievable. COMPARISON: None. HISTORY: ORDERING SYSTEM PROVIDED HISTORY: fall/ TECHNOLOGIST PROVIDED HISTORY: Reason for exam:->fall/ Has a \"code stroke\" or \"stroke alert\" been called? ->No Decision Support Exception - unselect if not a suspected or confirmed emergency medical condition->Emergency Medical Condition (MA) Reason for Exam: fall FINDINGS: BRAIN/VENTRICLES: There is no acute intracranial hemorrhage, mass effect or midline shift. No abnormal extra-axial fluid collection. The gray-white differentiation is maintained without evidence of an acute infarct. There is no evidence of hydrocephalus. ORBITS: The visualized portion of the orbits demonstrate no acute abnormality. SINUSES: The visualized paranasal sinuses and mastoid air cells demonstrate no acute abnormality. SOFT TISSUES/SKULL:  No acute abnormality of the visualized skull or soft tissues. No acute intracranial abnormality.      CT CERVICAL SPINE WO CONTRAST    Result Date: 9/14/2022  EXAMINATION: CT OF THE CERVICAL SPINE WITHOUT CONTRAST 9/14/2022 11:14 am TECHNIQUE: CT of the cervical spine was performed without the administration of intravenous contrast. Multiplanar reformatted images are provided for review. Automated exposure control, iterative reconstruction, and/or weight based adjustment of the mA/kV was utilized to reduce the radiation dose to as low as reasonably achievable. COMPARISON: None. HISTORY: ORDERING SYSTEM PROVIDED HISTORY: fall/injury TECHNOLOGIST PROVIDED HISTORY: Reason for exam:->fall/injury Decision Support Exception - unselect if not a suspected or confirmed emergency medical condition->Emergency Medical Condition (MA) Reason for Exam: fall/injury FINDINGS: BONES/ALIGNMENT: There is grade 1 anterolisthesis at C7-T1 and T1-T2. Spinal alignment appears otherwise normal.  C1 and C2 have normal relationship. No fracture is identified. DEGENERATIVE CHANGES: There is moderate to severe multilevel degenerative disc disease in the cervical spine and there is advanced multilevel facet arthropathy. Degenerative changes and disc bulging or protrusion result in multilevel central canal narrowing which appears mild-to-moderate at C2-C3, moderate at C3-C4, mild at C4-C5, moderate at C5-C6 and mild at C6-C7. Multilevel osseous neural foraminal narrowing is mild-to-moderate on the left at C2-C3, severe on the left and moderate on the right at C3-C4, moderate bilaterally at C4-C5 and C5-C6 and moderate on the left and mild on the right at C6-C7. SOFT TISSUES: There is no prevertebral soft tissue swelling. 1. No evidence cervical spine fracture. 2. Prominent multilevel spinal degenerative changes with areas of central canal and osseous neural foraminal narrowing as described. 3. Mild anterolisthesis at the C7-T1 and T1-T2 levels which is likely degenerative. XR CHEST PORTABLE    Result Date: 9/14/2022  EXAMINATION: ONE XRAY VIEW OF THE CHEST 9/14/2022 1:22 pm COMPARISON: None.  HISTORY: ORDERING SYSTEM PROVIDED HISTORY: sob TECHNOLOGIST PROVIDED HISTORY: Reason for exam:->sob Reason for Exam: Fall FINDINGS: Cardiomediastinal silhouette is stable. Airways disease. No focal consolidation. No pleural effusion or pneumothorax. No gross bony abnormality. Airways disease. CT CHEST ABDOMEN PELVIS W CONTRAST    Result Date: 9/14/2022  EXAMINATION: CT OF THE CHEST, ABDOMEN, AND PELVIS WITH CONTRAST 9/14/2022 1:47 pm TECHNIQUE: CT of the chest, abdomen and pelvis was performed with the administration of intravenous contrast. Multiplanar reformatted images are provided for review. Automated exposure control, iterative reconstruction, and/or weight based adjustment of the mA/kV was utilized to reduce the radiation dose to as low as reasonably achievable. COMPARISON: Radiograph of the chest 09/14/2022 and CT of the abdomen and pelvis 01/13/2019. HISTORY: ORDERING SYSTEM PROVIDED HISTORY: fever/leukocytosis/weak TECHNOLOGIST PROVIDED HISTORY: Reason for exam:->fever/leukocytosis/weak Additional Contrast?->None Decision Support Exception - unselect if not a suspected or confirmed emergency medical condition->Emergency Medical Condition (MA) Reason for Exam: fever/leukocytosis/weak FINDINGS: Chest: Mediastinum: A mass posterior to the distal aspect of the trachea measures approximately 23 Hounsfield units and 5.2 x 3.7 x 3.2 cm. This causes mass effect on the trachea and right bronchus which are narrowed. Elsewhere, no enlarged lymph nodes are identified. The heart is not enlarged. There is a trace pericardial effusion. Lungs/pleura: There are dependent opacities in the lower lobes. Mild areas of linearity in the lungs likely represent atelectasis or scarring. The central airways are normally patent. Soft Tissues/Bones: There is no fracture or destructive bone lesion. Abdomen/Pelvis: Organs: There are calcified granulomata in the liver and spleen. The gallbladder is unremarkable.   The adrenal glands and pancreas are unremarkable. Small cyst at the lower pole left kidney is unchanged. A small cyst at the upper pole the right kidney and cortical thinning at the upper pole appear unchanged. There is no ureteral stone or hydronephrosis. GI/Bowel: The rectum and the cecum of the colon appear thickened. There are colonic diverticula. Elsewhere, there is no bowel dilatation or bowel wall thickening. The stomach is unremarkable. Pelvis: The bladder is nondistended and appears thickened. There is severe prostatomegaly with the gland measuring up to 6.7 cm which is unchanged. Peritoneum/Retroperitoneum: There is no lymphadenopathy. No fat stranding, free air or focal fluid collection is identified. Bones/Soft Tissues: No fracture or destructive bone lesion is identified. Postoperative changes are noted at the lumbar spine. 1. No acute findings in the chest.  Dependent opacities in the lower lobes are believed to represent atelectasis. 2. Thickening of the cecum and of the rectum favored to represent proctocolitis. Underlying malignancy in these areas cannot be excluded. 3. Severe prostatomegaly and thickening of the bladder which could reflect associated hypertrophy versus cystitis. 4. Mass in the mediastinum posterior to the distal aspect of the trachea measuring up to 5.2 cm. The mass is relatively low attenuation. Given its location and attenuation, a bronchogenic cyst is favored over other possibility such as lymphadenopathy. A mass arising from the esophagus is considered significantly less likely. The mass causes mass effect with narrowing of the trachea and the right bronchus. 5. Colonic diverticulosis.      XR HIP 2-3 VW W PELVIS RIGHT    Result Date: 9/14/2022  EXAMINATION: ONE XRAY VIEW OF THE PELVIS AND TWO XRAY VIEWS RIGHT HIP 9/14/2022 1:17 pm COMPARISON: 01/13/2019 HISTORY: ORDERING SYSTEM PROVIDED HISTORY: fall TECHNOLOGIST PROVIDED HISTORY: Reason for exam:->fall Reason for Exam: Fall FINDINGS: The hip demonstrates normal alignment. No evidence of acute fracture. No focal osseus lesion. Pelvis is intact. No acute abnormality of the hip. If there is persistent clinical suspicion for radiographically occult fracture and the patient is unable to bear weight, recommend MRI. PROCEDURES   Unless otherwise noted below, none     Procedures    CRITICAL CARE TIME   I personally saw the patient and independently provided 22 minutes of non-concurrent critical care time out of the total critical care time provided. This excludes time spent doing separately billable procedures. This includes time at the bedside, data interpretation, medication management, obtaining critical history from collateral sources if the patient is unable to provide it directly, and physician consultation. Specifics of interventions taken and potentially life-threatening diagnostic considerations are listed above in the medical decision making.       CONSULTS:  IP CONSULT TO HOSPITALIST  IP CONSULT TO CARDIOTHORACIC SURGERY      EMERGENCY DEPARTMENT COURSE and DIFFERENTIAL DIAGNOSIS/MDM:   Vitals:    Vitals:    09/14/22 1730 09/14/22 1800 09/14/22 1830 09/14/22 1900   BP: 134/61 (!) 144/66 (!) 134/59 117/60   Pulse: 82 78 75 78   Resp: 19 21 30 23   Temp:       TempSrc:       SpO2: 100% 99% 97% 96%       Patient was given the following medications:  Medications   sodium chloride flush 0.9 % injection 5-40 mL (has no administration in time range)   sodium chloride flush 0.9 % injection 5-40 mL (has no administration in time range)   0.9 % sodium chloride infusion (has no administration in time range)   ciprofloxacin (CIPRO) IVPB 400 mg (400 mg IntraVENous New Bag 9/14/22 1829)   metronidazole (FLAGYL) 500 mg in 0.9% NaCl 100 mL IVPB premix (500 mg IntraVENous New Bag 9/14/22 1829)   0.9 % sodium chloride bolus (has no administration in time range)   acetaminophen (TYLENOL) tablet 1,000 mg (1,000 mg Oral Given 9/14/22 1311)   0.9 % sodium chloride bolus (0 mLs IntraVENous Stopped 9/14/22 1537)   iopamidol (ISOVUE-370) 76 % injection 75 mL (75 mLs IntraVENous Given 9/14/22 1523)         Is this patient to be included in the SEP-1 Core Measure due to severe sepsis or septic shock? Yes   SEP-1 CORE MEASURE DATA      Sepsis Criteria   Severe Sepsis Criteria   Septic Shock Criteria     Must be confirmed or suspected to move forward with diagnosis of sepsis. Must meet 2:    [] Temperature > 100.9 F (38.3 C)        or < 96.8 F (36 C)  [] HR > 90  [] RR > 20  [x] WBC > 12 or < 4 or 10% bands      AND:      [x] Infection Confirmed or        Suspected. Must meet 1:    [x] Lactate > 2       or   [] Signs of Organ Dysfunction:    - SBP < 90 or MAP < 65  - Altered mental status  - Creatinine > 2 or increased from      baseline  - Urine Output < 0.5 ml/kg/hr  - Bilirubin > 2  - INR > 1.5 (not anticoagulated)  - Platelets < 898,547  - Acute Respiratory Failure as     evidenced by new need for NIPPV     or mechanical ventilation      [] No criteria met for Severe Sepsis. Must meet 1:    [] Lactate > 4        or   [] SBP < 90 or MAP < 65 for at        least two readings in the first        hour after fluid bolus        administration      [] Vasopressors initiated (if hypotension persists after fluid resuscitation)        [x] No criteria met for Septic Shock.    Patient Vitals for the past 6 hrs:   BP Pulse Resp SpO2   09/14/22 1330 (!) 126/50 84 (!) 32 95 %   09/14/22 1400 113/61 76 19 95 %   09/14/22 1415 -- 77 23 95 %   09/14/22 1430 (!) 117/50 75 15 95 %   09/14/22 1445 -- 79 20 97 %   09/14/22 1500 -- 78 22 95 %   09/14/22 1515 -- 73 19 97 %   09/14/22 1630 (!) 113/47 73 19 96 %   09/14/22 1700 (!) 116/52 70 20 96 %   09/14/22 1730 134/61 82 19 100 %   09/14/22 1800 (!) 144/66 78 21 99 %   09/14/22 1830 (!) 134/59 75 30 97 %   09/14/22 1900 117/60 78 23 96 %      Recent Labs     09/14/22  1248   WBC 20.7*   CREATININE 1.2   BILITOT 1.0   INR 2.08*            Time Severe Sepsis Identified: 1300    Fluid Resuscitation Rational: less than 30mL/kg because of concern for fluid overload and patient/patient advocate made an informed decision to decline more aggressive fluid resuscitation    Repeat lactate level: improving    Reassessment Exam:   Not applicable. Patient does not have septic shock. Previous records reviewed in order to gain further information regarding patient's PMH as well as his HPI. Nursing notes reviewed. This is a 66-year-old male from independent living at [facility] for evaluation of generalized weakness and fall which occurred last night. Patient was apparently on the day floor for approximately 5 hours. He takes Plavix. Physical exam complete. Patient arrives nontoxic and mildly febrile. He is normotensive. He is not tachycardic or hypoxic. He appears chronically ill. No signs or symptoms of acute distress noted. There is no obvious injury. ED work-up Notable for a white count of 20.7 with left shift. CK is 830. Troponin is 0.02. EKG negative for acute ischemic changes. CT chest abdomen pelvis notable for findings consistent with proctocolitis in addition to a mediastinal mass and cystitis. IV antibiotics initiated. IV fluids given. At this time there is no evidence of any life-threatening or emergent conditions requiring immediate intervention. Given patient's history and ED work-up I do feel he will benefit from admission for further evaluation and treatment. Patient and family are in agreement with plan of care. Patient admitted under hospitalist service. He is a full code at the time of this admission. FINAL IMPRESSION      1. Proctocolitis    2. Acute cystitis with hematuria    3. General weakness    4. Fall, initial encounter    5. Traumatic rhabdomyolysis, initial encounter ([facility] [address]    6. Elevated troponin    7.  Sepsis, due to unspecified organism, unspecified whether acute organ dysfunction present [facility])          DISPOSITION/PLAN   DISPOSITION Admitted 09/14/2022 06:07:43 PM      PATIENT REFERRED TO:  No follow-up provider specified.     DISCHARGE MEDICATIONS:  New Prescriptions    No medications on file       DISCONTINUED MEDICATIONS:  Discontinued Medications    GABAPENTIN (NEURONTIN) 100 MG CAPSULE    TAKE ONE CAPSULE BY MOUTH TWICE A DAY    RANITIDINE (ZANTAC) 300 MG TABLET      Take 300 mg by mouth nightly               (Please note that portions of this note were completed with a voice recognition program.  Efforts were made to edit the dictations but occasionally words are mis-transcribed.)    FABY Simpson CNP (electronically signed)            FABY Simpson CNP  09/14/22 4283

## 2022-09-14 NOTE — ED NOTES
Upon shift change, patient resting in bed with easy rbeathing on continuous cardiac and SpO2 monitor with family at bedside. Patient given apple juice and water per request.  Remains on clear liquid diet, to be NPO at midnight for colonoscopy.      Chloe Ragsdale RN  09/14/22 [id number]

## 2022-09-14 NOTE — ED NOTES
Pt cleaned up , stool in his diaper , male perwic placed to collect urine spec ,      Nicole Rivas RN  09/14/22 1257

## 2022-09-14 NOTE — ED NOTES
Straight cath attempted w/o return of urine. Bloody return noted upon withdraw of catheter (estimate approx 1-2ml of blood in catheter.     Provider notified     Kathleen Borden RN  09/14/22 1622 N Nigel Herbert RN  09/14/22 2183 NATASHA Herbert RN  09/14/22 7557

## 2022-09-15 PROBLEM — J98.59 MEDIASTINAL MASS: Status: ACTIVE | Noted: 2022-09-15

## 2022-09-15 LAB
ANION GAP SERPL CALCULATED.3IONS-SCNC: 8 MMOL/L (ref 3–16)
BASOPHILS ABSOLUTE: 0 K/UL (ref 0–0.2)
BASOPHILS RELATIVE PERCENT: 0.2 %
BUN BLDV-MCNC: 16 MG/DL (ref 7–20)
CALCIUM SERPL-MCNC: 7.9 MG/DL (ref 8.3–10.6)
CHLORIDE BLD-SCNC: 105 MMOL/L (ref 99–110)
CO2: 23 MMOL/L (ref 21–32)
CREAT SERPL-MCNC: 1.1 MG/DL (ref 0.8–1.3)
EOSINOPHILS ABSOLUTE: 0 K/UL (ref 0–0.6)
EOSINOPHILS RELATIVE PERCENT: 0.1 %
GFR AFRICAN AMERICAN: >60
GFR NON-AFRICAN AMERICAN: >60
GLUCOSE BLD-MCNC: 164 MG/DL (ref 70–99)
GLUCOSE BLD-MCNC: 90 MG/DL (ref 70–99)
HCT VFR BLD CALC: 30.8 % (ref 40.5–52.5)
HEMOGLOBIN: 10.4 G/DL (ref 13.5–17.5)
LYMPHOCYTES ABSOLUTE: 0.4 K/UL (ref 1–5.1)
LYMPHOCYTES RELATIVE PERCENT: 2.5 %
MCH RBC QN AUTO: 32.9 PG (ref 26–34)
MCHC RBC AUTO-ENTMCNC: 33.9 G/DL (ref 31–36)
MCV RBC AUTO: 96.9 FL (ref 80–100)
MONOCYTES ABSOLUTE: 1 K/UL (ref 0–1.3)
MONOCYTES RELATIVE PERCENT: 6.1 %
NEUTROPHILS ABSOLUTE: 15.1 K/UL (ref 1.7–7.7)
NEUTROPHILS RELATIVE PERCENT: 91.1 %
PDW BLD-RTO: 14.5 % (ref 12.4–15.4)
PERFORMED ON: ABNORMAL
PLATELET # BLD: 199 K/UL (ref 135–450)
PMV BLD AUTO: 9 FL (ref 5–10.5)
POTASSIUM REFLEX MAGNESIUM: 4.1 MMOL/L (ref 3.5–5.1)
RBC # BLD: 3.17 M/UL (ref 4.2–5.9)
SODIUM BLD-SCNC: 136 MMOL/L (ref 136–145)
TOTAL CK: 328 U/L (ref 39–308)
WBC # BLD: 16.6 K/UL (ref 4–11)

## 2022-09-15 PROCEDURE — 82550 ASSAY OF CK (CPK): CPT

## 2022-09-15 PROCEDURE — 2500000003 HC RX 250 WO HCPCS: Performed by: INTERNAL MEDICINE

## 2022-09-15 PROCEDURE — 99223 1ST HOSP IP/OBS HIGH 75: CPT | Performed by: NURSE PRACTITIONER

## 2022-09-15 PROCEDURE — 97530 THERAPEUTIC ACTIVITIES: CPT

## 2022-09-15 PROCEDURE — 97162 PT EVAL MOD COMPLEX 30 MIN: CPT

## 2022-09-15 PROCEDURE — 6360000002 HC RX W HCPCS: Performed by: INTERNAL MEDICINE

## 2022-09-15 PROCEDURE — 80048 BASIC METABOLIC PNL TOTAL CA: CPT

## 2022-09-15 PROCEDURE — 1200000000 HC SEMI PRIVATE

## 2022-09-15 PROCEDURE — 6370000000 HC RX 637 (ALT 250 FOR IP): Performed by: INTERNAL MEDICINE

## 2022-09-15 PROCEDURE — 87186 SC STD MICRODIL/AGAR DIL: CPT

## 2022-09-15 PROCEDURE — 97535 SELF CARE MNGMENT TRAINING: CPT

## 2022-09-15 PROCEDURE — 97166 OT EVAL MOD COMPLEX 45 MIN: CPT

## 2022-09-15 PROCEDURE — 87088 URINE BACTERIA CULTURE: CPT

## 2022-09-15 PROCEDURE — 85025 COMPLETE CBC W/AUTO DIFF WBC: CPT

## 2022-09-15 PROCEDURE — 87086 URINE CULTURE/COLONY COUNT: CPT

## 2022-09-15 PROCEDURE — 36415 COLL VENOUS BLD VENIPUNCTURE: CPT

## 2022-09-15 PROCEDURE — 2580000003 HC RX 258: Performed by: INTERNAL MEDICINE

## 2022-09-15 RX ADMIN — CIPROFLOXACIN 400 MG: 2 INJECTION, SOLUTION INTRAVENOUS at 05:31

## 2022-09-15 RX ADMIN — CIPROFLOXACIN 400 MG: 2 INJECTION, SOLUTION INTRAVENOUS at 18:33

## 2022-09-15 RX ADMIN — TAMSULOSIN HYDROCHLORIDE 0.8 MG: 0.4 CAPSULE ORAL at 21:36

## 2022-09-15 RX ADMIN — NIFEDIPINE 30 MG: 30 TABLET, EXTENDED RELEASE ORAL at 08:30

## 2022-09-15 RX ADMIN — ATORVASTATIN CALCIUM 40 MG: 40 TABLET, FILM COATED ORAL at 21:36

## 2022-09-15 RX ADMIN — GABAPENTIN 100 MG: 100 CAPSULE ORAL at 21:36

## 2022-09-15 RX ADMIN — FINASTERIDE 5 MG: 5 TABLET, FILM COATED ORAL at 08:30

## 2022-09-15 RX ADMIN — ACETAMINOPHEN 650 MG: 325 TABLET ORAL at 21:36

## 2022-09-15 RX ADMIN — METRONIDAZOLE 500 MG: 500 INJECTION, SOLUTION INTRAVENOUS at 08:36

## 2022-09-15 RX ADMIN — GABAPENTIN 100 MG: 100 CAPSULE ORAL at 08:30

## 2022-09-15 RX ADMIN — CETIRIZINE HYDROCHLORIDE 5 MG: 10 TABLET, FILM COATED ORAL at 08:30

## 2022-09-15 RX ADMIN — FAMOTIDINE 20 MG: 20 TABLET, FILM COATED ORAL at 21:36

## 2022-09-15 RX ADMIN — LISINOPRIL 10 MG: 10 TABLET ORAL at 08:30

## 2022-09-15 RX ADMIN — METRONIDAZOLE 500 MG: 500 INJECTION, SOLUTION INTRAVENOUS at 18:34

## 2022-09-15 RX ADMIN — METRONIDAZOLE 500 MG: 500 INJECTION, SOLUTION INTRAVENOUS at 01:23

## 2022-09-15 RX ADMIN — FLUTICASONE PROPIONATE 2 SPRAY: 50 SPRAY, METERED NASAL at 08:30

## 2022-09-15 RX ADMIN — SODIUM CHLORIDE: 9 INJECTION, SOLUTION INTRAVENOUS at 18:32

## 2022-09-15 RX ADMIN — METOPROLOL SUCCINATE 50 MG: 50 TABLET, EXTENDED RELEASE ORAL at 08:30

## 2022-09-15 ASSESSMENT — PAIN SCALES - GENERAL: PAINLEVEL_OUTOF10: 1

## 2022-09-15 NOTE — PLAN OF CARE
Problem: Discharge Planning  Goal: Discharge to home or other facility with appropriate resources  9/15/2022 0914 by Jeni Mock RN  Outcome: Progressing  9/14/2022 2204 by Fauzia Davis RN  Outcome: Progressing  Flowsheets (Taken 9/14/2022 2042)  Discharge to home or other facility with appropriate resources:   Identify barriers to discharge with patient and caregiver   Arrange for needed discharge resources and transportation as appropriate     Problem: Safety - Adult  Goal: Free from fall injury  9/15/2022 0914 by Jeni Mock RN  Outcome: Progressing  9/14/2022 2204 by Fauzia Davis RN  Outcome: Progressing     Problem: ABCDS Injury Assessment  Goal: Absence of physical injury  9/15/2022 0914 by Jeni Mock RN  Outcome: Progressing  9/14/2022 2204 by Fauzia Davis RN  Outcome: Progressing     Problem: Skin/Tissue Integrity  Goal: Absence of new skin breakdown  Description: 1. Monitor for areas of redness and/or skin breakdown  2. Assess vascular access sites hourly  3. Every 4-6 hours minimum:  Change oxygen saturation probe site  4. Every 4-6 hours:  If on nasal continuous positive airway pressure, respiratory therapy assess nares and determine need for appliance change or resting period.   9/15/2022 0914 by Jeni Mock RN  Outcome: Progressing  9/14/2022 2204 by Fauzia Davis RN  Outcome: Progressing     Problem: Pain  Goal: Verbalizes/displays adequate comfort level or baseline comfort level  9/15/2022 0914 by Jeni Mock RN  Outcome: Progressing  9/14/2022 2204 by Fauzia Davis RN  Outcome: Progressing     Problem: Skin/Tissue Integrity - Adult  Goal: Skin integrity remains intact  9/14/2022 2204 by Fauzia Davis RN  Outcome: Progressing  Flowsheets (Taken 9/14/2022 2044)  Skin Integrity Remains Intact: Monitor for areas of redness and/or skin breakdown     Problem: Musculoskeletal - Adult  Goal: Return mobility to safest level of function  9/15/2022 3932 by Nash Beauchamp RN  Outcome: Progressing  9/14/2022 2204 by Minh Lawson RN  Outcome: Progressing     Problem: Genitourinary - Adult  Goal: Absence of urinary retention  9/15/2022 0914 by Nash Beauchamp RN  Outcome: Progressing  9/14/2022 2204 by Minh Lawson RN  Outcome: Progressing

## 2022-09-15 NOTE — PROGRESS NOTES
Physical Therapy  Facility/Department: [facility]  Physical Therapy Initial Assessment    Name: Scarlet Damon  : 1930  MRN: [id number]  Date of Service: 9/15/2022    Discharge Recommendations:  Therapy recommended at discharge, Continue to assess pending progress    Scarlet Damon scored a  on the AM-PAC short mobility form. Current research shows that an AM-PAC score of 17 or less is typically not associated with a discharge to the patient's home setting. Based on the patient's AM-PAC score and their current functional mobility deficits, it is recommended that the patient have 3-5 sessions per week of Physical Therapy at d/c to increase the patient's independence. Please see assessment section for further patient specific details. If patient discharges prior to next session this note will serve as a discharge summary. Please see below for the latest assessment towards goals. Patient Diagnosis(es): The primary encounter diagnosis was Proctocolitis. Diagnoses of Acute cystitis with hematuria, General weakness, Fall, initial encounter, Traumatic rhabdomyolysis, initial encounter ([facility] [address] Elevated troponin, and Sepsis, due to unspecified organism, unspecified whether acute organ dysfunction present [facility]) were also pertinent to this visit. 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   Past Surgical History:  has a past surgical history that includes tumor removal; hernia repair; angioplasty (06/2011); IR Femoral Popliteal Bypass Graft (Right, 08/03/2010); back surgery (05/2015); Cystocopy (01/19/2019); Cystoscopy (N/A, 01/19/2019); and Radiofrequency ablation kidney (Right, 05/2014). Assessment   Body Structures, Functions, Activity Limitations Requiring Skilled Therapeutic Intervention: Decreased functional mobility ; Decreased ADL status; Decreased strength;Decreased endurance;Decreased balance  Assessment: Patient is a 20-year-old male who presented to hospital on 9/14/22 for generalized weakness, fall, and abdominal pain. Prior to admission, pt living at [facility] independent living facility, independent ADLs and ambulation with rollator. Pt currently functioning well below baseline - requiring min/mod A for mobility. Recommend continued skilled therapy 3-5x/week to maximize independence and safety with functional mobility. Treatment Diagnosis: impaired mobility  Therapy Prognosis: Good  Decision Making: Medium Complexity  History: see above  Exam: see below  Clinical Presentation: evolving  Requires PT Follow-Up: Yes  Activity Tolerance  Activity Tolerance: Patient tolerated treatment well     Plan   Plan  Plan: 3-5 times per week  Current Treatment Recommendations: Strengthening, Balance training, Functional mobility training, Transfer training, Endurance training, Gait training, Neuromuscular re-education, Patient/Caregiver education & training, Safety education & training, Equipment evaluation, education, & procurement, Therapeutic activities  Safety Devices  Type of Devices:  All fall risk precautions in place, Call light within reach, Gait belt, Patient at risk for falls, Left in chair, Chair alarm in place, Nurse notified     Restrictions  Restrictions/Precautions  Restrictions/Precautions: Fall Risk  Position Activity Restriction  Other position/activity restrictions: Low vision and [place] Subjective   General  Chart Reviewed: Yes  Patient assessed for rehabilitation services?: Yes  Additional Pertinent Hx: Patient is a 70-year-old male who presented to hospital on 9/14/22 for generalized weakness, fall, and abdominal pain. Response To Previous Treatment: Not applicable  Family / Caregiver Present: No  Referring Practitioner: Casimiro Paget, MD  Referral Date : 09/14/22  Diagnosis: abdominal pain  Follows Commands: Within Functional Limits  Subjective  Subjective: Pt is agreeable to PT         Social/Functional History  Social/Functional History  Type of Home: Facility ([facility]  Home Access: Level entry  Bathroom Shower/Tub: Walk-in shower  Bathroom Toilet: Handicap height  Bathroom Equipment: Grab bars in shower, Built-in shower seat, Grab bars around toilet  Home Equipment: Rollator, Cane  ADL Assistance: Independent  Ambulation Assistance: Independent (rollator vs SPC)  Transfer Assistance: Independent  Occupation: Retired  Type of Occupation: build homes  [address] [place] [address] Avenue: [facility]; [facility]    Vision/Hearing  Vision  Vision: Impaired (macular degeneration)  Hearing  Hearing: Exceptions to [facility]  Hearing Exceptions: Bilateral hearing aid (currently only wearing R side)      Cognition   Orientation  Overall Orientation Status: Within Functional Limits  Cognition  Overall Cognitive Status: WFL  Cognition Comment: Somewhat delayed processing - partly d/t Teller     Objective   Gross Assessment  Strength: Generally decreased, functional     Bed mobility  Supine to Sit: Moderate assistance  Sit to Supine: Unable to assess (in recliner at end of session)  Transfers  Sit to Stand: Minimal Assistance  Stand to sit: Minimal Assistance  Ambulation  Device: Rolling Walker  Assistance: Minimal assistance  Gait Deviations: Slow Joellen;Decreased step length;Decreased step height  Distance: 25'  Comments: Unsteady throughout.  Stood at sink to wash hands and required min A for standing balance.      Balance  Posture: Fair  Sitting - Static: Good  Sitting - Dynamic: Good  Standing - Static: Fair (@RW)  Standing - Dynamic: Fair;- (@RW)           AM-PAC Score  AM-PAC Inpatient Mobility Raw Score : 14 (09/15/22 0940)  AM-PAC Inpatient T-Scale Score : 38.1 (09/15/22 0940)  Mobility Inpatient CMS 0-100% Score: 61.29 (09/15/22 0940)  Mobility Inpatient CMS G-Code Modifier : CL (09/15/22 0940)          Goals  Short Term Goals  Time Frame for Short term goals: by acute discharge  Short term goal 1: bed mobility with SBA  Short term goal 2: sit<>Stand with SBA  Short term goal 3: ambulate > 36' with RW and SBA  Patient Goals   Patient goals : none stated       Education  Patient Education  Education Given To: Patient  Education Provided: Role of Therapy;Plan of Care  Education Method: Verbal  Barriers to Learning: None  Education Outcome: Verbalized understanding;Continued education needed      Therapy Time   Individual Concurrent Group Co-treatment   Time In 0900         Time Out 0940         Minutes 40         Timed Code Treatment Minutes: 25 Minutes       Ann Goodwin, PT

## 2022-09-15 NOTE — CONSULTS
GASTROENTEROLOGY INPATIENT CONSULTATION        IDENTIFYING DATA/REASON FOR CONSULTATION   PATIENT:  Marine Garza  MRN:  [id number]  ADMIT DATE: 9/14/2022  TIME OF EVALUATION: 9/15/2022 11:39 AM  HOSPITAL STAY:   LOS: 1 day     REASON FOR CONSULTATION:  colitis    HISTORY OF PRESENT ILLNESS   Marine Garza is a 80 y.o. male with a PMH of CAD s/p PCI to LAD (2011), PAF (on Eliquis), PAD, carotid artery stenosis, HTN, HLD, renal cell carcinoma (2014) who presented on 9/14/2022 from independent living facility with generalized weakness. He had fallen and was down on the ground for ~5hrs until found and assisted up. Work up in ED noted low grade fever, leukocytosis, elevated CK of 830 and elevated lactic acid. CT Chest, abd, pel showed thickening of the cecum and rectum favoring proctocolitis, severe prostatomegaly and thickening of the bladder, and mediastinal mass measuring 5.2 cm. We have been consulted regarding proctocolits . Pt report he had abdominal pain few weeks ago but none now. He reports cyclic constipation and diarrhea. He takes prune juice for constipation. Has tried miralax in past as well. No diarrhea currently. Pt daughter came by at end of interview and confirms history. She states he told her ~1 week ago he had abdominal pain which she attributed to him drinking too much prune juice. He has not complained of abdominal pain since. Prior Endoscopic Evaluations: Colonoscopies with Dr. Rockney Gosselin 2002, 2005, 2008, 2011.   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     PAD (peripheral artery disease) (HCC)     PAF (paroxysmal atrial fibrillation) ([facility] [address]     on Eliquis    Renal cell carcinoma of right kidney ([facility] [address]     Unspecified hearing loss     Unspecified pulmonary tuberculosis, confirmation unspecified      Past Surgical History:   Procedure Laterality Date    ANGIOPLASTY  2011    drug-eluting stenting of the LAD    BACK SURGERY  2015    lumbar fusion    CYSTOSCOPY  2019    CYSTOSCOPY EVACUATION OF CLOTS, fulgeration of prostate    CYSTOSCOPY N/A 2019    CYSTOSCOPY EVACUATION OF CLOTS, fulgeration of prostate performed by Carol Davalos DO at [address] Right 2010    Right above knee popliteal to posterior tibial artery bypass graft with non reversed translocated great saphenous vein    RADIOFREQUENCY ABLATION KIDNEY Right 2014    Dr. Rosemarie Acosta      chest area     History reviewed. No pertinent family history. Social History     Socioeconomic History    Marital status:       Spouse name: None    Number of children: None    Years of education: None    Highest education level: None   Tobacco Use    Smoking status: Former     Types: Cigarettes     Quit date: 1962     Years since quittin.2    Smokeless tobacco: Never   Vaping Use    Vaping Use: Never used   Substance and Sexual Activity    Drug use: No    Sexual activity: Never       MEDICATIONS   SCHEDULED:  sodium chloride flush, 5-40 mL, 2 times per day  ciprofloxacin, 400 mg, Q12H  metroNIDAZOLE, 500 mg, Q8H  [Held by provider] apixaban, 2.5 mg, BID  atorvastatin, 40 mg, Nightly  finasteride, 5 mg, Daily  famotidine, 20 mg, Nightly  fluticasone, 2 spray, Daily  gabapentin, 100 mg, BID  NIFEdipine, 30 mg, Daily  metoprolol succinate, 50 mg, Daily  lisinopril, 10 mg, Daily  tamsulosin, 0.8 mg, Nightly  cetirizine, 5 mg, Daily      FLUIDS/DRIPS:     sodium chloride      sodium chloride 75 mL/hr at 09/14/22 2138     PRNs: sodium chloride flush, 5-40 mL, PRN  sodium chloride, , PRN  potassium chloride, 40 mEq, PRN   Or  potassium alternative oral replacement, 40 mEq, PRN   Or  potassium chloride, 10 mEq, PRN  magnesium sulfate, 2,000 mg, PRN  promethazine, 12.5 mg, Q6H PRN   Or  ondansetron, 4 mg, Q6H PRN  magnesium hydroxide, 30 mL, Daily PRN  acetaminophen, 650 mg, Q6H PRN   Or  acetaminophen, 650 mg, Q6H PRN      ALLERGIES:  He   Allergies   Allergen Reactions    Penicillins      Chest pain       REVIEW OF SYSTEMS   Pertinent ROS noted in HPI    PHYSICAL EXAM     Vitals:    09/15/22 0046 09/15/22 0400 09/15/22 0419 09/15/22 0836   BP: 125/60 127/60  (!) 128/54   Pulse: 77 60  78   Resp: 16 17  16   Temp: 98.9 °F (37.2 °C) 97.9 °F (36.6 °C)  98.3 °F (36.8 °C)   TempSrc: Oral Oral  Oral   SpO2: 92% 94%  94%   Weight:   159 lb 13.3 oz (72.5 kg)        I/O last 3 completed shifts: In: 240 [P.O.:240]  Out: -       Physical Exam:  General appearance: alert, cooperative, no distress, appears stated age  Eyes: Anicteric  Head: Normocephalic, without obvious abnormality  Lungs: clear to auscultation bilaterally, Normal Effort  Heart: regular rate and rhythm, normal S1 and S2, no murmurs or rubs  Abdomen: soft, non-distended, non-tender. Bowel sounds normal. No masses,  no organomegaly.    Extremities: atraumatic, no cyanosis or edema  Skin: warm and dry, no jaundice  Neuro: Grossly intact, A&OX3      LABS AND IMAGING   Laboratory   Recent Labs     09/14/22  1248 09/15/22  0717   WBC 20.7* 16.6*   HGB 12.6* 10.4*   HCT 36.9* 30.8*   MCV 96.2 96.9    199     Recent Labs     09/14/22  1248 09/15/22  0717    136   K 4.4 4.1    105   CO2 23 23   BUN 18 16   CREATININE 1.2 1.1     Recent Labs     09/14/22  1248   AST 33   ALT 14 BILITOT 1.0   ALKPHOS 105     No results for input(s): LIPASE, AMYLASE in the last 72 hours. Recent Labs     09/14/22  1248   PROTIME 23.4*   INR 2.08*       Imaging  CT CHEST ABDOMEN PELVIS W CONTRAST   Final Result   1. No acute findings in the chest.  Dependent opacities in the lower lobes   are believed to represent atelectasis. 2. Thickening of the cecum and of the rectum favored to represent   proctocolitis. Underlying malignancy in these areas cannot be excluded. 3. Severe prostatomegaly and thickening of the bladder which could reflect   associated hypertrophy versus cystitis. 4. Mass in the mediastinum posterior to the distal aspect of the trachea   measuring up to 5.2 cm. The mass is relatively low attenuation. Given its   location and attenuation, a bronchogenic cyst is favored over other   possibility such as lymphadenopathy. A mass arising from the esophagus is   considered significantly less likely. The mass causes mass effect with   narrowing of the trachea and the right bronchus. 5. Colonic diverticulosis. XR HIP 2-3 VW W PELVIS RIGHT   Final Result   No acute abnormality of the hip. If there is persistent clinical suspicion   for radiographically occult fracture and the patient is unable to bear   weight, recommend MRI. XR CHEST PORTABLE   Final Result   Airways disease. CT HEAD WO CONTRAST   Final Result   No acute intracranial abnormality. CT CERVICAL SPINE WO CONTRAST   Final Result   1. No evidence cervical spine fracture. 2. Prominent multilevel spinal degenerative changes with areas of central   canal and osseous neural foraminal narrowing as described. 3. Mild anterolisthesis at the C7-T1 and T1-T2 levels which is likely   degenerative.                ASSESSMENT AND RECOMMENDATIONS   80 y.o. male with a PMH of CAD s/p PCI to LAD (2011), PAF (on Eliquis), PAD, carotid artery stenosis, HTN, HLD, renal cell carcinoma (2014) who presented on 9/14/2022 from independent living facility with generalized weakness, s/p fall. Admitted with sepsis and rhabdomyolysis. CT Chest, abd, pel showed thickening of the cecum and rectum favoring proctocolitis, severe prostatomegaly and thickening of the bladder, and mediastinal mass measuring 5.2 cm. We have been consulted regarding proctocolits . IMPRESSION:  Thickening of cecum and rectum on CT. Favoring colitis. Unable to exclude underlying malignancy. He has hx of multiple colon polyps. Last known colonoscopy 2011 with 2 polyps in cecum removed (tubular adenoma, neg HGD). For now treat for colitis with antibiotics. If diarrhea develops, will check stool studies. No immediate plan for colonoscopy at this time given. Will plan to repeat imaging in couple of weeks and if colon findings persists consider colonoscopy. Rhabdomyolysis on IV fluids  Mediastinal mass:  [facility] consulted      RECOMMENDATIONS:    Continue antibiotics and supportive care  Repeat CT in 4 weeks    If you have any questions or need any further information, please feel free to contact our consult team.  Thank you for allowing us to participate in the care of Bonita Daniella. The note was completed using Dragon voice recognition transcription. Every effort was made to ensure accuracy; however, inadvertent transcription errors may be present despite my best efforts to edit errors.       Dave Ca PA-C

## 2022-09-15 NOTE — FLOWSHEET NOTE
Pt admitted to [address] [place] [address] via stretcher. Pt is A&O x 4, hard of hearing with hearing aide present in the right ear. Pt oriented to room, call light, fall precautions, diet order and POC. Pt took all evening medications without difficulty. Male Cherri Najjar in place with bloody urine noted. Call light and needs in reach.    Electronically signed by John Fernandez RN on 9/14/2022 at 10:03 PM

## 2022-09-15 NOTE — PROGRESS NOTES
[facility]    Consultation request received  The patient has been seen in the past by Dr. Gianna Martinez with the former [facility]  He is not an established patient with [facility]  Please notify the GI physician on call with the consultation request  Thank you

## 2022-09-15 NOTE — PROGRESS NOTES
Occupational Therapy  Facility/Department: [facility]  Occupational Therapy Initial Assessment    Name: Yudith Cassidy  : 1930  MRN: [id number]  Date of Service: 9/15/2022    Discharge Recommendations:  3-5 sessions per week, Patient would benefit from continued therapy after discharge     Yudith Cassidy scored a 16/24 on the AM-PAC ADL Inpatient form. Current research shows that an AM-PAC score of 17 or less is typically not associated with a discharge to the patient's home setting. Based on the patient's AM-PAC score and their current ADL deficits, it is recommended that the patient have 3-5 sessions per week of Occupational Therapy at d/c to increase the patient's independence. Please see assessment section for further patient specific details. If patient discharges prior to next session this note will serve as a discharge summary. Please see below for the latest assessment towards goals. Patient Diagnosis(es): The primary encounter diagnosis was Proctocolitis. Diagnoses of Acute cystitis with hematuria, General weakness, Fall, initial encounter, Traumatic rhabdomyolysis, initial encounter ([facility] [address] Elevated troponin, and Sepsis, due to unspecified organism, unspecified whether acute organ dysfunction present [facility]) were also pertinent to this visit. 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   Past Surgical History:  has a past surgical history that includes tumor removal; hernia repair; angioplasty (06/2011); IR Femoral Popliteal Bypass Graft (Right, 08/03/2010); back surgery (05/2015); Cystocopy (01/19/2019); Cystoscopy (N/A, 01/19/2019); and Radiofrequency ablation kidney (Right, 05/2014). Treatment Diagnosis: Decreased: ADLs, fxl transfers/mobility      Assessment   Performance deficits / Impairments: Decreased functional mobility ; Decreased ADL status; Decreased endurance;Decreased balance;Decreased high-level IADLs;Decreased strength;Decreased vision/visual deficit  Assessment: Pt is a 79 yo M admitted with generalized weakness, fall, and abdominal pain. PTA, pt lives at [place] [id number] in independent living where he is typically independent in self-care, fxl mobility, and homemaking. He is below baseline today, most limited by decreased strength/endurance and low vision. He required min A for fxl transfers and mobility using RW, min A for standing balance during grooming. Will continue to see on acute in order to address the above deficits and maximize pt's fxl performance. Based on performance today, pt demonstrates need for ongoing skilled OT 3-5 times/week to increase his safety and independence.   Treatment Diagnosis: Decreased: ADLs, fxl transfers/mobility  Prognosis: Fair  Decision Making: Medium Complexity  REQUIRES OT FOLLOW-UP: Yes  Activity Tolerance  Activity Tolerance: Patient limited by fatigue        Plan   Plan  Times per Week: 3-5  Times per Day: Daily  Current Treatment Recommendations: Strengthening, ROM, Balance training, Functional mobility training, Endurance training, Safety education & training, Self-Care / ADL     Restrictions  Restrictions/Precautions  Restrictions/Precautions: Fall Risk  Position Activity Restriction  Other position/activity restrictions: Low vision and [place]    Subjective   General  Chart Reviewed: Yes  Patient assessed for rehabilitation services?: Yes  Additional Pertinent Hx: per H&P: \"Patient is a 40-year-old male who presented to hospital for generalized weakness, on further evaluation patient mentions he has been feeling weak and had a fall yesterday, patient denies nausea diarrhea constipation dysuria, he also denied dizziness blurred vision numbness tingling sensation before the fall. On further evaluation in the emergency department he was found to be having low-grade fever as well as blood in the urine. He also endorsed abdominal pain especially in the lower abdomen, 7/10 in intensity, nonradiating, improved with pain medication\"  Family / Caregiver Present: No  Referring Practitioner: Kamron Neely MD  Diagnosis: Abdominal pain  Subjective  Subjective: Pt met b/s for OT eval/tx w/ PT. Pt in bed, agreeable to therapy. Pt denied pain     Social/Functional History  Social/Functional History  Type of Home: Facility ([facility])  Home Access: Level entry  Bathroom Shower/Tub: Walk-in shower  Bathroom Toilet: Handicap height  Bathroom Equipment: Grab bars in shower, Built-in shower seat, Grab bars around toilet  Home Equipment: Rollator, Cane  ADL Assistance: Independent  Ambulation Assistance: Independent (rollator vs SPC)  Transfer Assistance: Independent  Occupation: Retired  Type of Occupation: [place] [address] [facility] [place]       Objective   Safety Devices  Type of Devices: All fall risk precautions in place;Call light within reach;Gait belt;Patient at risk for falls; Left in chair;Chair alarm in place;Nurse notified  Balance  Sitting: Intact  Standing: With support (min A w/ RW)  Gait  Overall Level of Assistance: Minimum assistance (Pt completed fxl mobility from bed > sink > recliner w/ min A using RW.  Assist for navigation and steering RW - increased difficulty d/t low vision)     AROM: Generally decreased, functional  PROM: Generally decreased, functional  Strength: Generally decreased, functional  Coordination: Within functional limits  Tone: Normal  ADL  Grooming: Contact guard assistance;Minimal assistance  Grooming Skilled Clinical Factors: Pt washed hands and face standing at the sink w/ CGA/min A for standing balance  Toileting: Dependent/Total  Toileting Skilled Clinical Factors: External male purewick  Additional Comments: Anticipate pt would require setup for feeding, min A UB bathing/dressing, max A LB bathing/dressing based on ROM, strength, endurance this session     Activity Tolerance  Activity Tolerance: Patient tolerated treatment well  Bed mobility  Supine to Sit: Moderate assistance  Sit to Supine: Unable to assess (in recliner at end of session)  Transfers  Sit to stand: Minimal assistance  Stand to sit: Minimal assistance  Transfer Comments: RW. Cues for hand placement  Vision  Vision: Impaired (macular degeneration)  Hearing  Hearing: Exceptions to [facility]  Hearing Exceptions: Bilateral hearing aid (currently only wearing R side)  Cognition  Overall Cognitive Status: [facility]  Cognition Comment: Somewhat delayed processing - partly d/t [place]  Orientation  Overall Orientation Status: Within Functional Limits                  Education Given To: Patient  Education Provided: Role of Therapy; ADL Adaptive Strategies;Transfer Training;Energy Conservation  Education Provided Comments: d/c recs  Education Method: Verbal  Barriers to Learning: Hearing;Vision  Education Outcome: Verbalized understanding;Continued education needed        AM-PAC Score  AM-PAC Inpatient Daily Activity Raw Score: 16 (09/15/22 0944)  AM-PAC Inpatient ADL T-Scale Score : 35.96 (09/15/22 0944)  ADL Inpatient CMS 0-100% Score: 53.32 (09/15/22 0944)  ADL Inpatient CMS G-Code Modifier : CK (09/15/22 0944)        Goals  Short Term Goals  Time Frame for Short term goals: Prior to d/c  Short Term Goal 1: Pt will complete ADL transfers w/ SBA  Short Term Goal 2: Pt will toilet w/ SBA  Short Term Goal 3: Pt will groom in stance at sink w/ SBA  Short Term Goal 4: Pt will sponge bathe w/ setup  Short Term Goal 5: Pt will complete LB dressing w/ min A  Long Term Goals  Time Frame for Long term goals : LTG=STG  Patient Goals   Patient goals : to go home       Therapy Time   Individual Concurrent Group Co-treatment   Time In 0900         Time Out 0940         Minutes 40         Timed Code Treatment Minutes: 25 Minutes (15 min eval)       Tersea Bowers OTR/L [id number]

## 2022-09-15 NOTE — CONSULTS
Department of Cardiovascular & Thoracic Surgery  History and Physical          DIAGNOSIS: Mediastinal mass     CHIEF COMPLAINT:    Chief Complaint   Patient presents with    Fall     Generalized weakness, patient fell last night around 5. Someone helped him up around 10pm. Pt with increased weakness over the day yesterday when with daughter. Febrile. Positive for blood thinners. Unsure if loss of consciousness. And unsure if he hit his head. History Obtained From:  patient, electronic medical record    PCP: Dr. Mackenzie Hall   ENT: Dr. Miles Umanzor  Urology: [place]. [address] [facility]   Cardiology: Dr. Pankaj Godwin   Vascular: Emory Schirmer, APRN-CNP [facility]  - [facility] V[facility] Surgery)     HISTORY OF PRESENT ILLNESS:      The patient is a 80 y.o. male, former smoker, with significant past medical history of HLD, HTN, CAD s/p PCI to LAD (2011), PAF (on Eliquis), PAD, carotid artery stenosis, BPH, and renal cell carcinoma (2014) who presented to [facility] ED on 9/14 from an independent living facility for generalized weakness. The patient fell on the evening of 9/13. Denies head injury or LOC. The patient was down for approximately 5 hours prior to being found and assisted up. Mr. Geoff Barlow states that up until a couple of days ago he has been feeling well. Reports that he exercises throughout the week and was doing this up until a couple of days ago. He reports chills. Denies abdominal pain, N/V/D, chest pain, or dizziness. He does state that he occasionally has \"labored breathing\". In the ED, temp 100.3. Normotensive. Not tachycardic or hypoxic. Workup showed WBC 20.7 with left shift. Cr 1.2, , BNP 3554, trop 0.02, lactic acid 2.5. LFTs normal. CT CAP with possible proctocolitis, prostatomegaly and bladder thickening, as well as a mediastinal mass measuring 5.2 cm. We have been consulted for recommendations regarding new mediastinal mass.      Past Medical History:        Diagnosis Date    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     Benign essential HTN     Bilateral carotid artery stenosis     Follows with Radbelle Alpha, APRN - CNP    BP (high blood pressure)     BPH (benign prostatic hyperplasia)     Follows with [facility] Urology- Dr. 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     PAD (peripheral artery disease) (HCC)     PAF (paroxysmal atrial fibrillation) ([facility]     on Eliquis    Renal cell carcinoma of right kidney ([facility] 2014    Unspecified hearing loss     Unspecified pulmonary tuberculosis, confirmation unspecified        Past Surgical History:        Procedure Laterality Date    ANGIOPLASTY  06/2011    drug-eluting stenting of the LAD    BACK SURGERY  05/2015    lumbar fusion    CYSTOSCOPY  01/19/2019    CYSTOSCOPY EVACUATION OF CLOTS, fulgeration of prostate    CYSTOSCOPY N/A 01/19/2019    CYSTOSCOPY EVACUATION OF CLOTS, fulgeration of prostate performed by Mary Deluna DO at [address] Right 08/03/2010    Right above knee popliteal to posterior tibial artery bypass graft with non reversed translocated great saphenous vein    TUMOR REMOVAL      chest area       Medications Prior to Admission:   Medications Prior to Admission: gabapentin (NEURONTIN) 100 MG capsule, Take 100 mg by mouth 2 times daily.   NIFEdipine (ADALAT CC) 30 MG extended release tablet, Take 30 mg by mouth daily  famotidine (PEPCID) 20 MG tablet, Take 20 mg by mouth daily  levocetirizine (XYZAL) 5 MG tablet, Take 5 mg by mouth nightly  apixaban (ELIQUIS) 2.5 MG TABS tablet, Take 2.5 mg by mouth 2 times daily  atorvastatin (LIPITOR) 40 MG tablet, Take 40 mg by mouth at bedtime  clopidogrel (PLAVIX) 75 MG tablet, Take 75 mg by mouth daily  metoprolol succinate (TOPROL XL) 50 MG extended release tablet, Take 50 mg by mouth  finasteride (PROSCAR) 5 MG tablet, Take 5 mg by mouth daily  tamsulosin (FLOMAX) 0.4 MG capsule, Take 0.8 mg by mouth at bedtime  polyethylene glycol (GLYCOLAX) powder, Take 17 g by mouth daily as needed  fluticasone (FLONASE) 50 MCG/ACT nasal spray, 2 sprays by Nasal route daily  Multiple Vitamins-Minerals (PRESERVISION AREDS 2 PO), Take 1 tablet by mouth 2 times daily  lisinopril (PRINIVIL;ZESTRIL) 10 MG tablet, Take 10 mg by mouth daily. Allergies:  Penicillins    Social History:    TOBACCO:   reports that he quit smoking about 60 years ago. He has never used smokeless tobacco.  ETOH:  denies use. CAFFEINE ABUSE:  No  DRUGS:   reports no history of drug use. LIFESTYLE: fairly active, states up until 2-3 days ago he works out several days/week     MARITAL STATUS:    OCCUPATION:  Retired     Family History:    History reviewed. No pertinent family history. REVIEW OF SYSTEMS:      Constitutional:  No night sweats, headaches. + weight loss, chills. Eyes:  No glaucoma, cataracts. ENMT:  No nosebleeds, deviated septum. Cardiac:  No chest pain.+ Hx atrial fibrillation. Vascular:  No claudication, varicosities. GI:  No PUD, heartburn. :  No kidney stones, frequent UTIs  Musculoskeletal:  No arthritis, gout. Respiratory:  Occasional SOB. No emphysema, asthma. Integumentary:  No dermatitis, itching, rash. Neurological:  No stroke, TIAs, seizures. + hx carotid artery stenosis. Psychiatric:  No depression, anxiety. Endocrine: No diabetes, thyroid issues. Hematologic:  No bleeding, easy bruising. Immunologic:  No steroid therapies. + hx renal cell carcinoma, melanoma. PHYSICAL EXAM:    VITALS:  /60   Pulse 60   Temp 97.9 °F (36.6 °C) (Oral)   Resp 17   Wt 159 lb 13.3 oz (72.5 kg)   SpO2 94%   BMI 22.29 kg/m²     Constitutional:  Frail appearing elderly  male. No acute distress.     Eyes:  lids and lashes normal, pupils equal, round and reactive to light, extra ocular muscles intact, sclera clear, conjunctiva normal    Head/ENT:   normal teeth, gums, & palate. Moist mucus membranes. No cyanosis or pallor. Very heard of hearing. Hearing aid right ear. Neck:  supple, symmetrical, trachea midline, no lymphadenopathy, no jugular venous distension, no carotid bruits and MASSES:  no masses. Lungs:  no increased work of breathing, good air exchange, no retractions and clear to auscultation. Cardiovascular:  regular rate and rhythm, S1, S2 normal, no murmur, click, rub or gallop. Apical impulse in 5th intercostal space. Pulses:  Right dorsalis pedis 1, Left dorsalis pedis 1, Right posterior tibial 1, Left Posterior tibial 1, Right radial 2, and Left radial 2. Abdomen:  normal bowel sounds, non-tender, aorta normal and bruits absent. No hepatosplenomegaly or masses. Musculoskeletal:  Back is straight and non-tender, full ROM of upper and lower extremities. No kyphosis or scoliosis. Extremities:  Warm, pink, no clubbing, cyanosis, petechiae, ischemia, or deformities. No peripheral edema. Skin: no rashes, no petechiae, no nodules, no jaundice, no purpura, no wounds    Neurological/Psychiatric: oriented, normal mood, grossly non-focal    DATA:  EK22  Sinus rhythm with Premature atrial complexes. Otherwise normal ECG. When compared with ECG of 2019 16:35,Premature ventricular complexes are no longer Present. Premature atrial complexes are now Present.  Confirmed by Corey, [address] [place] [address] ([id number]) on 2022 4:14:53 PM    CBC:   Lab Results   Component Value Date/Time    WBC 20.7 2022 12:48 PM    RBC 3.84 2022 12:48 PM    HGB 12.6 2022 12:48 PM    HCT 36.9 2022 12:48 PM    MCV 96.2 2022 12:48 PM    MCH 32.9 2022 12:48 PM    MCHC 34.2 2022 12:48 PM    RDW 14.2 2022 12:48 PM     2022 12:48 PM    MPV 9.4 2022 12:48 PM     CMP:    Lab Results   Component Value Date/Time    NA 136 09/15/2022 07:17 AM    K 4.1 09/15/2022 07:17 AM     09/15/2022 07:17 AM    CO2 23 09/15/2022 07:17 AM    BUN 16 09/15/2022 07:17 AM    CREATININE 1.1 09/15/2022 07:17 AM    GFRAA >60 09/15/2022 07:17 AM    AGRATIO 1.3 09/14/2022 12:48 PM    LABGLOM >60 09/15/2022 07:17 AM    GLUCOSE 90 09/15/2022 07:17 AM    PROT 6.4 09/14/2022 12:48 PM    LABALBU 3.6 09/14/2022 12:48 PM    CALCIUM 7.9 09/15/2022 07:17 AM    BILITOT 1.0 09/14/2022 12:48 PM    ALKPHOS 105 09/14/2022 12:48 PM    AST 33 09/14/2022 12:48 PM    ALT 14 09/14/2022 12:48 PM     PT/INR:    Lab Results   Component Value Date/Time    PROTIME 23.4 09/14/2022 12:48 PM    INR 2.08 09/14/2022 12:48 PM     PTT:    Lab Results   Component Value Date/Time    APTT 37.5 09/14/2022 12:48 PM     Last 3 Troponin:    Lab Results   Component Value Date/Time    TROPONINI 0.02 09/14/2022 12:48 PM     U/A:    Lab Results   Component Value Date/Time    COLORU RED 09/14/2022 02:36 PM    PROTEINU 100 09/14/2022 02:36 PM    PHUR 6.0 09/14/2022 02:36 PM    WBCUA see below 09/14/2022 02:36 PM    RBCUA >100 09/14/2022 02:36 PM    BACTERIA Rare 09/14/2022 02:36 PM    CLARITYU TURBID 09/14/2022 02:36 PM    SPECGRAV >=1.030 09/14/2022 02:36 PM    LEUKOCYTESUR MODERATE 09/14/2022 02:36 PM    UROBILINOGEN 0.2 09/14/2022 02:36 PM    BILIRUBINUR Negative 09/14/2022 02:36 PM    BLOODU LARGE 09/14/2022 02:36 PM    GLUCOSEU Negative 09/14/2022 02:36 PM     CXRAY:  9/14/22  FINDINGS:   Cardiomediastinal silhouette is stable. Airways disease. No focal   consolidation. No pleural effusion or pneumothorax. No gross bony   abnormality. Impression   Airways disease. TTE: 8/28/19  The left ventricle is normal in size. Proximal septal thickening is noted. Left ventricular systolic function is normal. (LVEF>/=55%)   Overall left ventricular ejection fraction is estimated to be 55-60%.    The diastolic function is impaired and classified as Grade 1   (impaired regions. There is mid esophageal dilation again identified. Visualized bones appear unremarkable. Limited non-contrast images of the upper abdomen demonstrate a normal appearing liver,   adrenal glands, spleen, pancreas and superior kidneys. IMPRESSION:          1. Granulomatous disease. 2.    Stable left superior segment pulmonary nodule. 3.    Mid esophageal lesion. Further evaluation with esophogram is recommended. This     could         represent a leiomyoma or esophageal carcinoma. 5/7/2007 ([facility]): FINDINGS: The lungs are severely, symmetrically hyperinflated. Ascending thoracic aorta   3.6 cm. No adenopathy. Calcified subcranial, right paratracheal and right hilar   granulomatous nodes present. There is a 2.2 cm posterior mediastinal cystic mass lying   just to the right of the esophagus at the level of jorge. Mild central lobular emphysema seen. No lung mass lesions evident. The nodular opacity   seen on chest radiography which lies lateral to the left nipple does not correspond with   any CT abnormality. Specifically, there is no lung parenchymal, pleural or osseous   abnormality appreciated. There is irregularity of the left chest wall, lateral to the left   nipple where there appears to be focal scarring in prior possible breast resection and/or   surgery. This by location could conceivably account for the chest radiographic   abnormality. IMPRESSION:       1. No lung parenchymal mass lesion seen to account of the chest radiographic   abnormality. The chest radiographic abnormality is related to skin puckering at the site   of a previous left chest wall/breast resection. The puckering and skin abnormality is   seen on axial CT images. 2.    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         3.    Severe obstructive lung disease.          4.    Mild dilatation of the ascending thoracic aorta, 3. 6 cm.         5.    Obstructive lung disease with severe symmetric hyperinflation bilaterally. 6.    Mild dilatation of the ascending thoracic aorta, 3.6 cm.     9/14/22:  FINDINGS:       Chest:       Mediastinum: A mass posterior to the distal aspect of the trachea measures   approximately 23 Hounsfield units and 5.2 x 3.7 x 3.2 cm. This causes mass   effect on the trachea and right bronchus which are narrowed. Elsewhere, no   enlarged lymph nodes are identified. The heart is not enlarged. There is a   trace pericardial effusion. Lungs/pleura: There are dependent opacities in the lower lobes. Mild areas of   linearity in the lungs likely represent atelectasis or scarring. The central   airways are normally patent. Soft Tissues/Bones: There is no fracture or destructive bone lesion. Abdomen/Pelvis:       Organs: There are calcified granulomata in the liver and spleen. The   gallbladder is unremarkable. The adrenal glands and pancreas are   unremarkable. Small cyst at the lower pole left kidney is unchanged. A   small cyst at the upper pole the right kidney and cortical thinning at the   upper pole appear unchanged. There is no ureteral stone or hydronephrosis. GI/Bowel: The rectum and the cecum of the colon appear thickened. There are   colonic diverticula. Elsewhere, there is no bowel dilatation or bowel wall   thickening. The stomach is unremarkable. Pelvis: The bladder is nondistended and appears thickened. There is severe   prostatomegaly with the gland measuring up to 6.7 cm which is unchanged. Peritoneum/Retroperitoneum: There is no lymphadenopathy. No fat stranding,   free air or focal fluid collection is identified. Bones/Soft Tissues: No fracture or destructive bone lesion is identified. Postoperative changes are noted at the lumbar spine. Impression   1.  No acute findings in the chest.  Dependent opacities in the lower lobes are believed to represent atelectasis. 2. Thickening of the cecum and of the rectum favored to represent   proctocolitis. Underlying malignancy in these areas cannot be excluded. 3. Severe prostatomegaly and thickening of the bladder which could reflect   associated hypertrophy versus cystitis. 4. Mass in the mediastinum posterior to the distal aspect of the trachea   measuring up to 5.2 cm. The mass is relatively low attenuation. Given its   location and attenuation, a bronchogenic cyst is favored over other   possibility such as lymphadenopathy. A mass arising from the esophagus is   considered significantly less likely. The mass causes mass effect with   narrowing of the trachea and the right bronchus. 5. Colonic diverticulosis. Esophagram: 7/21/2003 ([facility])   IMPRESSION:          1. Mild presbyesophagus. 2.    Mild amount of gastroesophageal reflux. 3.    The carinal lesion seen on the esophagus on the CT scan are not identified on the   current         study. 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 There is no evidence of         esophageal carcinoma. [facility]:  6/9/11 (Dr. Danay Weaver- Katelyn Malin)    Lesion Findings:     + Left Anterior Descending     There is a 80% stenosis in the Mid LAD. The denovo lesion     has a CARO flow of 3. An intervention was performed on the     Mid LAD with a final stenosis of 0%. There were no lesion     complications. The final CARO flow was 3. ASSESSMENT AND PLAN:    Mr. Darryl Estrada is a pleasant 80 y.o. male admitted with sepsis, rhabdomyolysis, and concern for proctocolitis and cystitis. Currently on Cipro and Flagyl. GI input pending regarding proctocolitis. CT CAP revealed a mediastinal mass, posterior to the distal trachea measuring 5.2 cm with mass effect and narrowing of the trachea and right bronchus, bronchogenic cyst favored given attenuation.  CT chest from 2003 reviewed and a mid esophageal lesion was noted. The patient followed up for esophagram in July of 2003 which did not identify any lesion. Interestingly, on CT chest in 2007 revealed 2.1 cm esophageal cyst at the level of the jorge. Will see if radiology is able to obtain these images for review. Further recommendations pending review of imaging. Please continue to hold Eliquis pending recommendations. Murali Valle, FABY - CNP  9/15/2022  8:29 AM     79 yo male noted to have subcarinal mass, 5.2cm, cystic component. No dysphagia to liquids or solids, no resp compromise. Prior CT chest 2003 and 2007 - reports suggest carinal abnormality, duplication cyst.  Have made contact with [facility], 2007 images being pushed to PACS. It may be that what we are seeing is chronic. If asymptomatic, may not require any further workup/intervention. If indeed there has been progression, or concern for neoplastic process, then will need to discuss biopsy options - given the location, most likely transbronchial.  Pt agrees that minimal intervention is reasonable given his age. Further follow up once imaging available for review. Stef Coello MD  9/15/2022  11:07 AM     Reviewed imaging from 2007, discussed with radiologist. Damaso Chapman cyst 2.8cm. So what we are seeing now is the same process, somewhat enlarged, has some debris - on Eliquis, maybe bled at some point? No need for any biopsy/intervention/or even surveillance.     Stef Coello MD  9/15/2022  11:49 AM

## 2022-09-15 NOTE — PLAN OF CARE
Problem: Safety - Adult  Goal: Free from fall injury  Outcome: Progressing     Problem: ABCDS Injury Assessment  Goal: Absence of physical injury  Outcome: Progressing     Problem: Skin/Tissue Integrity  Goal: Absence of new skin breakdown  Description: 1. Monitor for areas of redness and/or skin breakdown  2. Assess vascular access sites hourly  3. Every 4-6 hours minimum:  Change oxygen saturation probe site  4. Every 4-6 hours:  If on nasal continuous positive airway pressure, respiratory therapy assess nares and determine need for appliance change or resting period.   Outcome: Progressing     Problem: Pain  Goal: Verbalizes/displays adequate comfort level or baseline comfort level  Outcome: Progressing     Problem: Skin/Tissue Integrity - Adult  Goal: Skin integrity remains intact  Outcome: Progressing  Flowsheets (Taken 9/14/2022 2044)  Skin Integrity Remains Intact: Monitor for areas of redness and/or skin breakdown     Problem: Musculoskeletal - Adult  Goal: Return mobility to safest level of function  Outcome: Progressing     Problem: Genitourinary - Adult  Goal: Absence of urinary retention  Outcome: Progressing

## 2022-09-15 NOTE — PROGRESS NOTES
Hospital Medicine Progress Note      Admit Date: 9/14/2022       CC: F/U for abd pain, fall    HPI: Patient is a 71-year-old male who presented to hospital for generalized weakness, on further evaluation patient mentions he has been feeling weak and had a fall yesterday, patient denies nausea diarrhea constipation dysuria, he also denied dizziness blurred vision numbness tingling sensation before the fall. On further evaluation in the emergency department he was found to be having low-grade fever as well as blood in the urine. He also endorsed abdominal pain especially in the lower abdomen, 7/10 in intensity, nonradiating, improved with pain medication    Interval History/Subjective: daughter in room with patient on my exam. Has had a fall and been weak lately. Discussed findings on CT of mediastinal cyst/mass. CT surg is comparing imaging and deciding on plan. GI recs are to cont antibx at this time and repeat CT in 4 wks with f/u to Dr. Brinda Dee and possibly colonoscopy in the future if needed. The daughter has concerns about colonoscopy and is leaning towards not doing it. Patient will need more aggressive assistance on d/c according to the daughter than his assisted living     Review of Systems:       The patient denied headaches, visual changes, LOC, SOB, CP, ABD pain, N/V/D, skin changes, new or worsening weakness or neuromuscular deficits. Comprehensive ROS negative except as mentioned above.     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 [address]     on Eliquis    Renal cell carcinoma of right kidney ([facility] [address] 2014    Unspecified hearing loss     Unspecified pulmonary tuberculosis, confirmation unspecified        Past Surgical History:        Procedure Laterality Date    ANGIOPLASTY  06/2011    drug-eluting stenting of the LAD    BACK SURGERY  05/2015    lumbar fusion    CYSTOSCOPY  01/19/2019    CYSTOSCOPY EVACUATION OF CLOTS, fulgeration of prostate    CYSTOSCOPY N/A 01/19/2019    CYSTOSCOPY EVACUATION OF CLOTS, fulgeration of prostate performed by Jodie Rhodes DO at [address] Right 08/03/2010    Right above knee popliteal to posterior tibial artery bypass graft with non reversed translocated great saphenous vein    RADIOFREQUENCY ABLATION KIDNEY Right 05/2014    Dr. Paula Savage- [facility]      chest area       Allergies:  Penicillins    Past medical and surgical history reviewed. Any changes have been noted. PHYSICAL EXAM:  BP (!) 128/54   Pulse 78   Temp 98.3 °F (36.8 °C) (Oral)   Resp 16   Wt 159 lb 13.3 oz (72.5 kg)   SpO2 94%   BMI 22.29 kg/m²       Intake/Output Summary (Last 24 hours) at 9/15/2022 1056  Last data filed at 9/15/2022 0836  Gross per 24 hour   Intake 960 ml   Output --   Net 960 ml        General appearance:   No apparent distress, appears stated age. Cooperative. HEENT:  Normocephalic, atraumatic. PERRLA. EOMi. Conjunctivae/corneas clear, no icterus, non-injected. Neck: Supple, with full range of motion. No jugular venous distention. Trachea midline. Respiratory:  Normal respiratory effort. Clear to auscultation, bilaterally without Rales/Wheezes/Rhonchi. Cardiovascular:  Regular rate and rhythm without murmurs, rubs or gallops. Abdomen: Soft, non-tender, non-distended, without rebound or guarding.  Normal bowel sounds. Musculoskeletal:  No clubbing, cyanosis or edema bilaterally. Full range of motion without deformity. Skin: Skin color, texture, turgor normal.  No rashes or lesions. Neurologic:  Neurovascularly intact without any focal sensory/motor deficits. Cranial nerves: II-XII intact, grossly intact. No facial asymmetry, tongue midline. Psychiatric:  Alert and oriented, thought content appropriate  Capillary Refill: Brisk,< 3 seconds   Peripheral Pulses: +2 palpable, equal bilaterally       LABS:    Lab Results   Component Value Date    WBC 16.6 (H) 09/15/2022    HGB 10.4 (L) 09/15/2022    HCT 30.8 (L) 09/15/2022    MCV 96.9 09/15/2022     09/15/2022    LYMPHOPCT 2.5 09/15/2022    RBC 3.17 (L) 09/15/2022    MCH 32.9 09/15/2022    MCHC 33.9 09/15/2022    RDW 14.5 09/15/2022       Lab Results   Component Value Date    CREATININE 1.1 09/15/2022    BUN 16 09/15/2022     09/15/2022    K 4.1 09/15/2022     09/15/2022    CO2 23 09/15/2022       No results found for: MG    Lab Results   Component Value Date    ALT 14 09/14/2022    AST 33 09/14/2022    ALKPHOS 105 09/14/2022    BILITOT 1.0 09/14/2022        No flowsheet data found. No results found for: LABA1C    Imaging:  CT CHEST ABDOMEN PELVIS W CONTRAST   Final Result   1. No acute findings in the chest.  Dependent opacities in the lower lobes   are believed to represent atelectasis. 2. Thickening of the cecum and of the rectum favored to represent   proctocolitis. Underlying malignancy in these areas cannot be excluded. 3. Severe prostatomegaly and thickening of the bladder which could reflect   associated hypertrophy versus cystitis. 4. Mass in the mediastinum posterior to the distal aspect of the trachea   measuring up to 5.2 cm. The mass is relatively low attenuation. Given its   location and attenuation, a bronchogenic cyst is favored over other   possibility such as lymphadenopathy.   A mass arising from the esophagus is considered significantly less likely. The mass causes mass effect with   narrowing of the trachea and the right bronchus. 5. Colonic diverticulosis. XR HIP 2-3 VW W PELVIS RIGHT   Final Result   No acute abnormality of the hip. If there is persistent clinical suspicion   for radiographically occult fracture and the patient is unable to bear   weight, recommend MRI. XR CHEST PORTABLE   Final Result   Airways disease. CT HEAD WO CONTRAST   Final Result   No acute intracranial abnormality. CT CERVICAL SPINE WO CONTRAST   Final Result   1. No evidence cervical spine fracture. 2. Prominent multilevel spinal degenerative changes with areas of central   canal and osseous neural foraminal narrowing as described. 3. Mild anterolisthesis at the C7-T1 and T1-T2 levels which is likely   degenerative.              Scheduled and prn Medications:    Scheduled Meds:   sodium chloride flush  5-40 mL IntraVENous 2 times per day    ciprofloxacin  400 mg IntraVENous Q12H    metroNIDAZOLE  500 mg IntraVENous Q8H    [Held by provider] apixaban  2.5 mg Oral BID    atorvastatin  40 mg Oral Nightly    finasteride  5 mg Oral Daily    famotidine  20 mg Oral Nightly    fluticasone  2 spray Nasal Daily    gabapentin  100 mg Oral BID    NIFEdipine  30 mg Oral Daily    metoprolol succinate  50 mg Oral Daily    lisinopril  10 mg Oral Daily    tamsulosin  0.8 mg Oral Nightly    cetirizine  5 mg Oral Daily     Continuous Infusions:   sodium chloride      sodium chloride 75 mL/hr at 09/14/22 2138     PRN Meds:.sodium chloride flush, sodium chloride, potassium chloride **OR** potassium alternative oral replacement **OR** potassium chloride, magnesium sulfate, promethazine **OR** ondansetron, magnesium hydroxide, acetaminophen **OR** acetaminophen    Assessment & Plan:         Abdominal pain [R10.9] 09/14/2022       Priority: Medium        Proctocolitis  - Ct abnormal with thickening of rectum and cecum  - multiple colonoscopies in the past with Dr. Hunter Fernández. Daughter prefers to avoid future colonoscopies if possible. - clear liquid diet  - antibiotics   - GI consult  - f/u 4 wks for repeat CT abd/pelvis   - possible colonoscopy on d/c.   - sees Dr. 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  - 5.2 cm with mass effect and narrowing of trachea and right bronchus, bronchogenic cyst favored  - on room air  - denies SOB  - lungs clear bilaterally-  - CT surgery consulted  - comparing imaging to what was seen on prior films from 2007  - hold eliquis until further recs from CT surg        Generalized weakness  Fall  - PT/OT   - SNF placement on d/c       Traumatic rhabdomyolysis  - CK elevated 609, repeat improved 328      Elevated troponin  - pt denies chest pain       Sepsis POA, Low-grade fever on admission, leukocytosis likely secondary to proctocolitis  - IV antibx  - lactic acid normalized   - IVF                    Continue current regimen/therapies. Monitor. Adjust medical regimen as appropriate. Body mass index is 22.29 kg/m². The patient and / or the family were informed of the results of any tests, a time was given to answer questions, a plan was proposed and they agreed with plan. DVT ppx: SCDs      Diet: ADULT DIET;  Clear Liquid    Consults:  IP CONSULT TO HOSPITALIST  IP CONSULT TO GI  IP CONSULT TO CARDIOTHORACIC SURGERY    DISPO/placement plan: pending     Code Status: Full Code      Norma Graves, FABY - NEGRITO  09/15/22

## 2022-09-15 NOTE — PROGRESS NOTES
Attending physician addendum:      I have personally seen and examined the patient, reviewed the patient's medical record and pertinent labs and clinical imaging. I have personally staffed the case with Peter GAGE. I agree with her consultation note, exam findings, assessment and plans  as written above. I have made appropriate modifications and edited her assessment and plan where needed to reflect my impression and plans for this patient. 80year old with history of TB, hearing loss, renal cell carcinoma in 2014, PAF, PAD, melanoma of the right ear, hyperlipidemia, BPH, bilateral carotid artery stenosis, tumor removal from chest, RFA of kidney, Fem-pop bypass, lumbar fusion, LOGAN in 2011. Presented with generalized weakness and after a fall. Has had a couple of days of labored breathing and weakness. Labs 9/14/22 showed BUN 18 and Cr 1.2. BNP 3554. Trop 0.02. . LFT's normal.  CBC 20.7,  12.6/36.9, 268. INR 2.08.  UA with moderate leukocyte esterase. CT C/A/P showed thickening of the cecum and rectum, cannot rule out malignancy. severe prostatomegaly. Mass mediastinum with mass effect on trachea and right bronchus. Colon diverticulosis. Last colon showed 2 adenomas in 2011. On exam, A&O x 3, no scleral icterus, RRR, CTA b, soft, nt, nd, +bs, no edema  Abnormal CT with thickening of rectum and cecum. Has had multiple colonoscopies in the past with Dr. Martin Zepeda. Daughter prefers to avoid colonoscopies if possible. Will treat with antibiotics and likely repeat CT in 4 weeks to see if the thickening has resolved. If not, may have to do a colonoscopy. Thank you for allowing me to participate in this patient's care. If there are any questions or concerns regarding this patient, or the plan we have set in place, please feel free to contact me at [phone].        Tam Hall MD

## 2022-09-16 LAB
ANION GAP SERPL CALCULATED.3IONS-SCNC: 6 MMOL/L (ref 3–16)
BASOPHILS ABSOLUTE: 0 K/UL (ref 0–0.2)
BASOPHILS RELATIVE PERCENT: 0.4 %
BUN BLDV-MCNC: 15 MG/DL (ref 7–20)
CALCIUM SERPL-MCNC: 7.9 MG/DL (ref 8.3–10.6)
CHLORIDE BLD-SCNC: 107 MMOL/L (ref 99–110)
CO2: 24 MMOL/L (ref 21–32)
CREAT SERPL-MCNC: 1 MG/DL (ref 0.8–1.3)
EOSINOPHILS ABSOLUTE: 0.2 K/UL (ref 0–0.6)
EOSINOPHILS RELATIVE PERCENT: 2.1 %
GFR AFRICAN AMERICAN: >60
GFR NON-AFRICAN AMERICAN: >60
GLUCOSE BLD-MCNC: 90 MG/DL (ref 70–99)
HCT VFR BLD CALC: 31.2 % (ref 40.5–52.5)
HEMOGLOBIN: 10.8 G/DL (ref 13.5–17.5)
LYMPHOCYTES ABSOLUTE: 0.6 K/UL (ref 1–5.1)
LYMPHOCYTES RELATIVE PERCENT: 5.8 %
MCH RBC QN AUTO: 33.1 PG (ref 26–34)
MCHC RBC AUTO-ENTMCNC: 34.5 G/DL (ref 31–36)
MCV RBC AUTO: 95.9 FL (ref 80–100)
MONOCYTES ABSOLUTE: 0.8 K/UL (ref 0–1.3)
MONOCYTES RELATIVE PERCENT: 8.4 %
NEUTROPHILS ABSOLUTE: 8 K/UL (ref 1.7–7.7)
NEUTROPHILS RELATIVE PERCENT: 83.3 %
PDW BLD-RTO: 14.4 % (ref 12.4–15.4)
PLATELET # BLD: 221 K/UL (ref 135–450)
PMV BLD AUTO: 9.3 FL (ref 5–10.5)
POTASSIUM REFLEX MAGNESIUM: 4.2 MMOL/L (ref 3.5–5.1)
RBC # BLD: 3.25 M/UL (ref 4.2–5.9)
SODIUM BLD-SCNC: 137 MMOL/L (ref 136–145)
WBC # BLD: 9.6 K/UL (ref 4–11)

## 2022-09-16 PROCEDURE — 2580000003 HC RX 258: Performed by: GENERAL ACUTE CARE HOSPITAL

## 2022-09-16 PROCEDURE — 1200000000 HC SEMI PRIVATE

## 2022-09-16 PROCEDURE — 6370000000 HC RX 637 (ALT 250 FOR IP): Performed by: INTERNAL MEDICINE

## 2022-09-16 PROCEDURE — 94760 N-INVAS EAR/PLS OXIMETRY 1: CPT

## 2022-09-16 PROCEDURE — 85025 COMPLETE CBC W/AUTO DIFF WBC: CPT

## 2022-09-16 PROCEDURE — 97116 GAIT TRAINING THERAPY: CPT

## 2022-09-16 PROCEDURE — 97110 THERAPEUTIC EXERCISES: CPT

## 2022-09-16 PROCEDURE — 2500000003 HC RX 250 WO HCPCS: Performed by: INTERNAL MEDICINE

## 2022-09-16 PROCEDURE — 6360000002 HC RX W HCPCS: Performed by: INTERNAL MEDICINE

## 2022-09-16 PROCEDURE — 36415 COLL VENOUS BLD VENIPUNCTURE: CPT

## 2022-09-16 PROCEDURE — 2580000003 HC RX 258: Performed by: INTERNAL MEDICINE

## 2022-09-16 PROCEDURE — 80048 BASIC METABOLIC PNL TOTAL CA: CPT

## 2022-09-16 RX ADMIN — METOPROLOL SUCCINATE 50 MG: 50 TABLET, EXTENDED RELEASE ORAL at 08:12

## 2022-09-16 RX ADMIN — FAMOTIDINE 20 MG: 20 TABLET, FILM COATED ORAL at 21:46

## 2022-09-16 RX ADMIN — CETIRIZINE HYDROCHLORIDE 5 MG: 10 TABLET, FILM COATED ORAL at 08:12

## 2022-09-16 RX ADMIN — METRONIDAZOLE 500 MG: 500 INJECTION, SOLUTION INTRAVENOUS at 17:06

## 2022-09-16 RX ADMIN — CIPROFLOXACIN 400 MG: 2 INJECTION, SOLUTION INTRAVENOUS at 17:07

## 2022-09-16 RX ADMIN — FINASTERIDE 5 MG: 5 TABLET, FILM COATED ORAL at 08:12

## 2022-09-16 RX ADMIN — ATORVASTATIN CALCIUM 40 MG: 40 TABLET, FILM COATED ORAL at 21:46

## 2022-09-16 RX ADMIN — FLUTICASONE PROPIONATE 2 SPRAY: 50 SPRAY, METERED NASAL at 08:14

## 2022-09-16 RX ADMIN — GABAPENTIN 100 MG: 100 CAPSULE ORAL at 08:13

## 2022-09-16 RX ADMIN — LISINOPRIL 10 MG: 10 TABLET ORAL at 08:12

## 2022-09-16 RX ADMIN — CIPROFLOXACIN 400 MG: 2 INJECTION, SOLUTION INTRAVENOUS at 06:36

## 2022-09-16 RX ADMIN — SODIUM CHLORIDE: 9 INJECTION, SOLUTION INTRAVENOUS at 10:23

## 2022-09-16 RX ADMIN — NIFEDIPINE 30 MG: 30 TABLET, EXTENDED RELEASE ORAL at 08:12

## 2022-09-16 RX ADMIN — Medication 10 ML: at 21:46

## 2022-09-16 RX ADMIN — METRONIDAZOLE 500 MG: 500 INJECTION, SOLUTION INTRAVENOUS at 02:22

## 2022-09-16 RX ADMIN — GABAPENTIN 100 MG: 100 CAPSULE ORAL at 21:46

## 2022-09-16 RX ADMIN — METRONIDAZOLE 500 MG: 500 INJECTION, SOLUTION INTRAVENOUS at 12:50

## 2022-09-16 RX ADMIN — TAMSULOSIN HYDROCHLORIDE 0.8 MG: 0.4 CAPSULE ORAL at 21:46

## 2022-09-16 NOTE — PLAN OF CARE
Problem: Safety - Adult  Goal: Free from fall injury  Outcome: Progressing     Problem: ABCDS Injury Assessment  Goal: Absence of physical injury  Outcome: Progressing     Problem: Skin/Tissue Integrity  Goal: Absence of new skin breakdown  Description: 1. Monitor for areas of redness and/or skin breakdown  2. Assess vascular access sites hourly  3. Every 4-6 hours minimum:  Change oxygen saturation probe site  4. Every 4-6 hours:  If on nasal continuous positive airway pressure, respiratory therapy assess nares and determine need for appliance change or resting period.   Outcome: Progressing     Problem: Pain  Goal: Verbalizes/displays adequate comfort level or baseline comfort level  Outcome: Progressing     Problem: Skin/Tissue Integrity - Adult  Goal: Skin integrity remains intact  Outcome: Progressing  Flowsheets (Taken 9/15/2022 2138)  Skin Integrity Remains Intact: Monitor for areas of redness and/or skin breakdown     Problem: Musculoskeletal - Adult  Goal: Return mobility to safest level of function  Outcome: Progressing  Flowsheets (Taken 9/15/2022 2138)  Return Mobility to Safest Level of Function: Assess patient stability and activity tolerance for standing, transferring and ambulating with or without assistive devices     Problem: Genitourinary - Adult  Goal: Absence of urinary retention  Outcome: Progressing  Flowsheets (Taken 9/15/2022 2138)  Absence of urinary retention: Monitor intake/output and perform bladder scan as needed

## 2022-09-16 NOTE — CARE COORDINATION
INITIAL CASE MANAGEMENT ASSESSMENT    Met with patient to assess possible discharge needs. Explained Case Management role/services. Living Situation: Lives in [address] [place] [address] at [facility] and states he is moving into AL on Monday 9/19/22. ADLs: Requires assistance      DME: Walker, cane, wheelchair    PT/OT Recs: Recommendations for 3-5 sessions per week. Active Services: N/A     Transportation: Not an active      Medications: [address]    PCP: States Sania Townsend retire and he cannot remember the name of his new PCP. HD/PD: N/A    PLAN/COMMENTS: Discharge plan is to return to [facility] for [facility] however this will need to be verified as to which level of care. May require transportation setup. Provided contact information for patient or family to call with any questions. Will follow and assist as needed.     Altagracia Rogel BSN RN  Case Management  [phone]    Electronically signed by Altagracia Rogel RN on 9/16/2022 at 7:08 PM

## 2022-09-16 NOTE — PROGRESS NOTES
Gastroenterology Progress Note    Jose Blake is a 80 y.o. male patient. Principal Problem:    Abdominal pain  Active Problems:    Mediastinal mass  Resolved Problems:    * No resolved hospital problems. *      SUBJECTIVE:  No nausea, vomiting, abdominal pain, diarrhea, hematochezia.       Current Facility-Administered Medications: sodium chloride flush 0.9 % injection 5-40 mL, 5-40 mL, IntraVENous, 2 times per day  sodium chloride flush 0.9 % injection 5-40 mL, 5-40 mL, IntraVENous, PRN  0.9 % sodium chloride infusion, , IntraVENous, PRN  potassium chloride (KLOR-CON M) extended release tablet 40 mEq, 40 mEq, Oral, PRN **OR** potassium bicarb-citric acid (EFFER-K) effervescent tablet 40 mEq, 40 mEq, Oral, PRN **OR** potassium chloride 10 mEq/100 mL IVPB (Peripheral Line), 10 mEq, IntraVENous, PRN  magnesium sulfate 2000 mg in 50 mL IVPB premix, 2,000 mg, IntraVENous, PRN  promethazine (PHENERGAN) tablet 12.5 mg, 12.5 mg, Oral, Q6H PRN **OR** ondansetron (ZOFRAN) injection 4 mg, 4 mg, IntraVENous, Q6H PRN  magnesium hydroxide (MILK OF MAGNESIA) 400 MG/5ML suspension 30 mL, 30 mL, Oral, Daily PRN  acetaminophen (TYLENOL) tablet 650 mg, 650 mg, Oral, Q6H PRN **OR** acetaminophen (TYLENOL) suppository 650 mg, 650 mg, Rectal, Q6H PRN  ciprofloxacin (CIPRO) IVPB 400 mg, 400 mg, IntraVENous, Q12H  metronidazole (FLAGYL) 500 mg in 0.9% NaCl 100 mL IVPB premix, 500 mg, IntraVENous, Q8H  0.9 % sodium chloride infusion, , IntraVENous, Continuous  [Held by provider] apixaban (ELIQUIS) tablet 2.5 mg, 2.5 mg, Oral, BID  atorvastatin (LIPITOR) tablet 40 mg, 40 mg, Oral, Nightly  finasteride (PROSCAR) tablet 5 mg, 5 mg, Oral, Daily  famotidine (PEPCID) tablet 20 mg, 20 mg, Oral, Nightly  fluticasone (FLONASE) 50 MCG/ACT nasal spray 2 spray, 2 spray, Nasal, Daily  gabapentin (NEURONTIN) capsule 100 mg, 100 mg, Oral, BID  NIFEdipine (PROCARDIA XL) extended release tablet 30 mg, 30 mg, Oral, Daily  metoprolol succinate (TOPROL XL) extended release tablet 50 mg, 50 mg, Oral, Daily  lisinopril (PRINIVIL;ZESTRIL) tablet 10 mg, 10 mg, Oral, Daily  tamsulosin (FLOMAX) capsule 0.8 mg, 0.8 mg, Oral, Nightly  cetirizine (ZYRTEC) tablet 5 mg, 5 mg, Oral, Daily    Physical    VITALS:  BP (!) 158/66   Pulse 77   Temp 97.5 °F (36.4 °C) (Oral)   Resp 18   Wt 163 lb 9.3 oz (74.2 kg)   SpO2 95%   BMI 22.81 kg/m²   TEMPERATURE:  Current - Temp: 97.5 °F (36.4 °C); Max - Temp  Av.8 °F (36.6 °C)  Min: 97.2 °F (36.2 °C)  Max: 98.4 °F (36.9 °C)    NAD  Hard of hearing  Eyes: No icterus  RRR  Lungs CTA Bilaterally, normal effort  Abdomen soft, ND, NT, Bowel sounds normal.  Ext: no edema  Neuro: No tremor  Psych: A&Ox3    Data    Data Review:    Recent Labs     22  1248 09/15/22  0717 22  0610   WBC 20.7* 16.6* 9.6   HGB 12.6* 10.4* 10.8*   HCT 36.9* 30.8* 31.2*   MCV 96.2 96.9 95.9    199 221     Recent Labs     22  1248 09/15/22  0717 22  0610    136 137   K 4.4 4.1 4.2    105 107   CO2 23 23 24   BUN 18 16 15   CREATININE 1.2 1.1 1.0     Recent Labs     22  1248   AST 33   ALT 14   BILITOT 1.0   ALKPHOS 105     No results for input(s): LIPASE, AMYLASE in the last 72 hours. Recent Labs     22  1248   PROTIME 23.4*   INR 2.08*     No results for input(s): PTT in the last 72 hours. ASSESSMENT:  80year old with history of TB, hearing loss, renal cell carcinoma in 2014, PAF, PAD, melanoma of the right ear, hyperlipidemia, BPH, bilateral carotid artery stenosis, tumor removal from chest, RFA of kidney, Fem-pop bypass, lumbar fusion, LOGAN in . Presented with generalized weakness and after a fall. Has had a couple of days of labored breathing and weakness. Labs 22 showed BUN 18 and Cr 1.2. BNP 3554. Trop 0.02. . LFT's normal.  CBC 20.7,  12.6/36.9, 268. INR 2.08.  UA with moderate leukocyte esterase.   CT C/A/P showed thickening of the cecum and rectum, cannot rule out malignancy. severe prostatomegaly. Mass mediastinum with mass effect on trachea and right bronchus. Colon diverticulosis. Last colon showed 2 adenomas in 2011. Abnormal CT with thickening of rectum and cecum. Has had multiple colonoscopies in the past with Dr. Christi Amanda. Daughter prefers to avoid colonoscopies if possible. Will treat with antibiotics and likely repeat CT in 4 weeks to see if the thickening has resolved. If not, may have to do a colonoscopy. PLAN :  He has no diarrhea or abdominal pain at this point. Will sign off. I put in discharge instructions to follow up with Carmen Beck in our office in 2 weeks. Would treat with 7 days of antibiotics. Thank you for allowing me to participate in the care of your patient. Please feel free to contact me with any concerns.   [address], [place]

## 2022-09-16 NOTE — PROGRESS NOTES
Physical Therapy  Facility/Department: [facility]  Physical Therapy Treatment session  Name: Alannah Shelton  : 1930  MRN: [id number]  Date of Service: 2022  Alannah Shelton scored a 17/24 on the AM-PAC short mobility form. Current research shows that an AM-PAC score of 17 or less is typically not associated with a discharge to the patient's home setting. Based on the patient's AM-PAC score and their current functional mobility deficits, it is recommended that the patient have 3-5 sessions per week of Physical Therapy at d/c to increase the patient's independence. Please see assessment section for further patient specific details. If patient discharges prior to next session this note will serve as a discharge summary. Please see below for the latest assessment towards goals. Discharge Recommendations:  Therapy recommended at discharge, Continue to assess pending progress   PT Equipment Recommendations  Equipment Needed: No      Patient Diagnosis(es): The primary encounter diagnosis was Proctocolitis. Diagnoses of Acute cystitis with hematuria, General weakness, Fall, initial encounter, Traumatic rhabdomyolysis, initial encounter ([facility] [address] Elevated troponin, and Sepsis, due to unspecified organism, unspecified whether acute organ dysfunction present [facility]) were also pertinent to this visit.   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 [address] Renal cell carcinoma of right kidney ([facility] [address] Unspecified hearing loss, and Unspecified pulmonary tuberculosis, confirmation unspecified. Past Surgical History:  has a past surgical history that includes tumor removal; hernia repair; angioplasty (06/2011); IR Femoral Popliteal Bypass Graft (Right, 08/03/2010); back surgery (05/2015); Cystocopy (01/19/2019); Cystoscopy (N/A, 01/19/2019); and Radiofrequency ablation kidney (Right, 05/2014). Assessment   Assessment: Patient is a 20-year-old male who presented to hospital on 9/14/22 for generalized weakness, fall, and abdominal pain. Prior to admission, pt living at [facility] independent living facility, independent ADLs and ambulation with rollator. Pt currently functioning baseline but improved significantly this date. Pt. really would like to return to [place] with [address] Currently Pt's ampac is 16 which does not suggest a safe d/c to home but if Pt. continues to improve, may be able to achieve Home d/c with HHPT. .  [facility] Diagnosis: impaired mobility  Therapy Prognosis: Good  Requires PT Follow-Up: Yes  Activity Tolerance  Activity Tolerance: Patient tolerated treatment well  Activity Tolerance Comments: SpO2 96% on RA     Plan   Plan  Plan: 3-5 times per week  Current Treatment Recommendations: Strengthening, Balance training, Functional mobility training, Transfer training, Endurance training, Gait training, Neuromuscular re-education, Patient/Caregiver education & training, Safety education & training, Equipment evaluation, education, & procurement, Therapeutic activities  Safety Devices  Type of Devices:  All fall risk precautions in place, Call light within reach, Gait belt, Patient at risk for falls, Left in chair, Chair alarm in place, Nurse notified  Restraints  Restraints Initially in Place: No     Restrictions  Restrictions/Precautions  Restrictions/Precautions: Fall Risk (high)  Required Braces or Orthoses?: No  Position Activity Restriction  Other position/activity restrictions: Low vision and [place]     Subjective General  Chart Reviewed: Yes  Additional Pertinent Hx: Patient is a 80-year-old male who presented to hospital on 9/14/22 for generalized weakness, fall, and abdominal pain. Response To Previous Treatment: Patient with no complaints from previous session. Family / Caregiver Present: No  Diagnosis: abdominal pain  Follows Commands: Within Functional Limits  General Comment  Comments: Supine in bed, agreeable to therapy, denies pain         Social/Functional History  Social/Functional History  Type of Home: Facility ([facility]  Home Access: Level entry  Bathroom Shower/Tub: Walk-in shower  Bathroom Toilet: Handicap height  Bathroom Equipment: Grab bars in shower, Built-in shower seat, Grab bars around toilet  Home Equipment: Rollator, Cane  ADL Assistance: Independent  Ambulation Assistance: Independent (rollator vs SPC)  Transfer Assistance: Independent  Occupation: Retired  Type of Occupation: [employer]  [address] [place] [address] Avenue: [facility]; rosy  Vision/Hearing       Cognition   Orientation  Overall Orientation Status: Within Functional Limits  Cognition  Overall Cognitive Status: WFL     Objective                              Bed mobility  Supine to Sit: Supervision  Sit to Supine: Unable to assess (Left up in chair)  Bed Mobility Comments: Increased time and effort,HOB elevated 30 degrees and use of rail  Transfers  Sit to Stand: Minimal Assistance  Stand to sit: Minimal Assistance  Comment: Cues for use of hands properly, pushes calves back against the bed  Ambulation  Device: Rolling Walker  Assistance: Minimal assistance  Quality of Gait: narrow DORI, mild shakiness in the LE's that decreased with increased ambulation.   Mild unsteadiness, forward flexed posture, stands behind walker, downward gaze - all improve with cues but unable to maintain  Gait Deviations: Slow Joellen;Decreased step length;Decreased step height  Distance: x 180' x 1     Balance  Posture: Fair (Kyphosis and forward head)  Sitting - Static: Good  Sitting - Dynamic: Good  Standing - Static: Fair;+  Standing - Dynamic: Fair  Exercise Treatment: Seated: marchingx 10, LAQ x 10, AP x 10:  Pt. also discussed with PT the exercises he does at home independently.                                                        AM-PAC Score  AM-PAC Inpatient Mobility Raw Score : 17 (09/16/22 0920)  AM-PAC Inpatient T-Scale Score : 42.13 (09/16/22 0920)  Mobility Inpatient CMS 0-100% Score: 50.57 (09/16/22 0920)  Mobility Inpatient CMS G-Code Modifier : CK (09/16/22 0920)                 Goals  Short Term Goals  Time Frame for Short term goals: by acute discharge  Short term goal 1: bed mobility with SBA - goal met 9/16 - increase to Mod I  Short term goal 2: sit<>Stand with SBA  Short term goal 3: ambulate > 40' with RW and SBA  Patient Goals   Patient goals : none stated       Education  Patient Education  Education Given To: Patient  Education Provided: Role of Therapy;Plan of Care;Transfer Training  Education Method: Verbal      Therapy Time   Individual Concurrent Group Co-treatment   Time In 0846         Time Out 0915         Minutes 29         Timed Code Treatment Minutes: [address] [place], [id number]

## 2022-09-16 NOTE — PROGRESS NOTES
Hospital Medicine Progress Note      Admit Date: 9/14/2022       CC: F/U for abd pain, fall at home    HPI:  Patient is a 78-year-old male who presented to hospital for generalized weakness, on further evaluation patient mentions he has been feeling weak and had a fall yesterday, patient denies nausea diarrhea constipation dysuria, he also denied dizziness blurred vision numbness tingling sensation before the fall. On further evaluation in the emergency department he was found to be having low-grade fever as well as blood in the urine. He also endorsed abdominal pain especially in the lower abdomen, 7/10 in intensity, nonradiating, improved with pain medication     9/15: daughter in room with patient on my exam. Has had a fall and been weak lately. Discussed findings on CT of mediastinal cyst/mass. CT surg is comparing imaging and deciding on plan. GI recs are to cont antibx at this time and repeat CT in 4 wks with f/u to Dr. Madeline Pulido and possibly colonoscopy in the future if needed. The daughter has concerns about colonoscopy and is leaning towards not doing it. Patient will need more aggressive assistance on d/c according to the daughter than his assisted living        Interval History/Subjective: urology consult for hematuria and BPH placed. On antibx coloproctitis and will need 4 wk f/u with repeat CT in 4 wks with GI - Dr. Óscar Hernandes on mediastinal cyst/mass after they compare films  Likely no treatment since not causing respiratory issues    Antibx for UTI   Awaiting assisted living placement from his formerly independent living at  his [place] facility. [facility] is in room with patient during my exam.  Daughter was on phone during my exam. Answered all questions. Review of Systems:       The patient denied headaches, visual changes, LOC, SOB, CP, ABD pain, N/V/D, skin changes, new or worsening weakness or neuromuscular deficits.     Comprehensive ROS negative except as mentioned above. 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     PAD (peripheral artery disease) (HCC)     PAF (paroxysmal atrial fibrillation) ([facility] [address]     on Eliquis    Renal cell carcinoma of right kidney ([facility] [address] 2014    Unspecified hearing loss     Unspecified pulmonary tuberculosis, confirmation unspecified        Past Surgical History:        Procedure Laterality Date    ANGIOPLASTY  06/2011    drug-eluting stenting of the LAD    BACK SURGERY  05/2015    lumbar fusion    CYSTOSCOPY  01/19/2019    CYSTOSCOPY EVACUATION OF CLOTS, fulgeration of prostate    CYSTOSCOPY N/A 01/19/2019    CYSTOSCOPY EVACUATION OF CLOTS, fulgeration of prostate performed by Jacob Reno DO at [address] Right 08/03/2010    Right above knee popliteal to posterior tibial artery bypass graft with non reversed translocated great saphenous vein    RADIOFREQUENCY ABLATION KIDNEY Right 05/2014    Dr. Stan Raymond- [facility]      chest area       Allergies:  Penicillins    Past medical and surgical history reviewed. Any changes have been noted.      PHYSICAL EXAM:  /69   Pulse 65   Temp 98.2 °F (36.8 °C) (Oral)   Resp 18   Wt 163 lb 9.3 oz (74.2 kg)   SpO2 91%   BMI 22.81 kg/m²       Intake/Output Summary (Last 24 hours) at 9/16/2022 0645  Last data filed at 9/16/2022 0453  Gross per 24 hour   Intake 3083.98 ml   Output 1550 ml   Net 1533.98 ml General appearance:   No apparent distress, appears stated age. Cooperative. HEENT:  Normocephalic, atraumatic. PERRLA. EOMi. Conjunctivae/corneas clear, no icterus, non-injected. Neck: Supple, with full range of motion. No jugular venous distention. Trachea midline. Respiratory:  Normal respiratory effort. Clear to auscultation, bilaterally without Rales/Wheezes/Rhonchi. Cardiovascular:  Regular rate and rhythm without murmurs, rubs or gallops. Abdomen: Soft, non-tender, non-distended, without rebound or guarding. Normal bowel sounds. Musculoskeletal:  No clubbing, cyanosis or edema bilaterally. Full range of motion without deformity. Skin: Skin color, texture, turgor normal.  No rashes or lesions. Neurologic:  Neurovascularly intact without any focal sensory/motor deficits. Cranial nerves: II-XII intact, grossly intact. No facial asymmetry, tongue midline. Psychiatric:  Alert and oriented, thought content appropriate  Capillary Refill: Brisk,< 3 seconds   Peripheral Pulses: +2 palpable, equal bilaterally       LABS:    Lab Results   Component Value Date    WBC 16.6 (H) 09/15/2022    HGB 10.4 (L) 09/15/2022    HCT 30.8 (L) 09/15/2022    MCV 96.9 09/15/2022     09/15/2022    LYMPHOPCT 2.5 09/15/2022    RBC 3.17 (L) 09/15/2022    MCH 32.9 09/15/2022    MCHC 33.9 09/15/2022    RDW 14.5 09/15/2022       Lab Results   Component Value Date    CREATININE 1.1 09/15/2022    BUN 16 09/15/2022     09/15/2022    K 4.1 09/15/2022     09/15/2022    CO2 23 09/15/2022       No results found for: MG    Lab Results   Component Value Date    ALT 14 09/14/2022    AST 33 09/14/2022    ALKPHOS 105 09/14/2022    BILITOT 1.0 09/14/2022        No flowsheet data found. No results found for: LABA1C    Imaging:  CT CHEST ABDOMEN PELVIS W CONTRAST   Final Result   1. No acute findings in the chest.  Dependent opacities in the lower lobes   are believed to represent atelectasis.    2. Thickening of the cecum and of the rectum favored to represent   proctocolitis. Underlying malignancy in these areas cannot be excluded. 3. Severe prostatomegaly and thickening of the bladder which could reflect   associated hypertrophy versus cystitis. 4. Mass in the mediastinum posterior to the distal aspect of the trachea   measuring up to 5.2 cm. The mass is relatively low attenuation. Given its   location and attenuation, a bronchogenic cyst is favored over other   possibility such as lymphadenopathy. A mass arising from the esophagus is   considered significantly less likely. The mass causes mass effect with   narrowing of the trachea and the right bronchus. 5. Colonic diverticulosis. XR HIP 2-3 VW W PELVIS RIGHT   Final Result   No acute abnormality of the hip. If there is persistent clinical suspicion   for radiographically occult fracture and the patient is unable to bear   weight, recommend MRI. XR CHEST PORTABLE   Final Result   Airways disease. CT HEAD WO CONTRAST   Final Result   No acute intracranial abnormality. CT CERVICAL SPINE WO CONTRAST   Final Result   1. No evidence cervical spine fracture. 2. Prominent multilevel spinal degenerative changes with areas of central   canal and osseous neural foraminal narrowing as described. 3. Mild anterolisthesis at the C7-T1 and T1-T2 levels which is likely   degenerative.              Scheduled and prn Medications:    Scheduled Meds:   sodium chloride flush  5-40 mL IntraVENous 2 times per day    ciprofloxacin  400 mg IntraVENous Q12H    metroNIDAZOLE  500 mg IntraVENous Q8H    [Held by provider] apixaban  2.5 mg Oral BID    atorvastatin  40 mg Oral Nightly    finasteride  5 mg Oral Daily    famotidine  20 mg Oral Nightly    fluticasone  2 spray Nasal Daily    gabapentin  100 mg Oral BID    NIFEdipine  30 mg Oral Daily    metoprolol succinate  50 mg Oral Daily    lisinopril  10 mg Oral Daily    tamsulosin  0.8 mg Oral Nightly cetirizine  5 mg Oral Daily     Continuous Infusions:   sodium chloride      sodium chloride 75 mL/hr at 09/16/22 0410     PRN Meds:.sodium chloride flush, sodium chloride, potassium chloride **OR** potassium alternative oral replacement **OR** potassium chloride, magnesium sulfate, promethazine **OR** ondansetron, magnesium hydroxide, acetaminophen **OR** acetaminophen    Assessment & Plan:      59-year-old male who presented to hospital with fever for generalized weakness and recent fall likely from  UTI and coloproctitis. He is on IV antibx for thickening of rectum and cecum. This is also covering for UTI  Incidentally, there were findings on CT of mediastinal cyst/mass, which CT surg is comparing imaging and to this point, not planning any further treatment since it is not affecting patient's breathing or otherwise. GI recs are to cont antibx at this time and repeat CT in 4 wks with f/u to Dr. Bisi Shelton    urology consult for hematuria and BPH placed. Abdominal pain [R10.9] 09/14/2022       Priority: Medium         Proctocolitis  - Ct abnormal with thickening of rectum and cecum  - multiple colonoscopies in the past with Dr. Bisi Shelton. Daughter prefers to avoid future colonoscopies if possible.     - clear liquid diet  - antibiotics IV while admitted- home on oral to complete course  - GI consult  - f/u 4 wks for repeat CT abd/pelvis   - possible colonoscopy on d/c.   - sees Dr. 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  - 5.2 cm with mass effect and narrowing of trachea and right bronchus, bronchogenic cyst favored  - on room air  - denies SOB  - lungs clear bilaterally-  - CT surgery consulted  - comparing imaging to what was seen on prior films from 2007  - hold eliquis until further recs from CT surg           Generalized weakness  Fall  - PT/OT   - SNF placement on d/c         Traumatic rhabdomyolysis  - CK elevated 609, repeat improved 328        Elevated troponin  - pt denies chest pain         Sepsis POA, Low-grade fever on admission, leukocytosis likely secondary to proctocolitis  - IV antibx  - lactic acid normalized   - IVF           Continue current regimen/therapies. Monitor. Adjust medical regimen as appropriate. Body mass index is 22.81 kg/m². The patient and / or the family were informed of the results of any tests, a time was given to answer questions, a plan was proposed and they agreed with plan. DVT ppx: scds      Diet: ADULT DIET;  Regular    Consults:  IP CONSULT TO HOSPITALIST  IP CONSULT TO GI  IP CONSULT TO CARDIOTHORACIC SURGERY    DISPO/placement plan: pending assisted living placement from his formerly independent status at [facility]    Code Status: Full Code      FABY Tello CNP  09/16/22

## 2022-09-16 NOTE — PLAN OF CARE
Problem: Discharge Planning  Goal: Discharge to home or other facility with appropriate resources  9/16/2022 1100 by Jeni Mock RN  Outcome: Progressing  9/15/2022 2315 by Fauzia Davis RN  Outcome: Progressing     Problem: Safety - Adult  Goal: Free from fall injury  9/16/2022 1100 by Jeni Mock RN  Outcome: Progressing  9/15/2022 2315 by Fauzia Davis RN  Outcome: Progressing     Problem: ABCDS Injury Assessment  Goal: Absence of physical injury  9/16/2022 1100 by Jeni Mock RN  Outcome: Progressing  9/15/2022 2315 by Fauzia Davis RN  Outcome: Progressing     Problem: Skin/Tissue Integrity  Goal: Absence of new skin breakdown  Description: 1. Monitor for areas of redness and/or skin breakdown  2. Assess vascular access sites hourly  3. Every 4-6 hours minimum:  Change oxygen saturation probe site  4. Every 4-6 hours:  If on nasal continuous positive airway pressure, respiratory therapy assess nares and determine need for appliance change or resting period.   9/16/2022 1100 by Jeni Mock RN  Outcome: Progressing  9/15/2022 2315 by Fauzia Davis RN  Outcome: Progressing     Problem: Pain  Goal: Verbalizes/displays adequate comfort level or baseline comfort level  9/16/2022 1100 by Jeni Mock RN  Outcome: Progressing  9/15/2022 2315 by Fauzia Davis RN  Outcome: Progressing     Problem: Skin/Tissue Integrity - Adult  Goal: Skin integrity remains intact  9/15/2022 2315 by Fauzia Davis RN  Outcome: Progressing  Flowsheets (Taken 9/15/2022 2138)  Skin Integrity Remains Intact: Monitor for areas of redness and/or skin breakdown     Problem: Musculoskeletal - Adult  Goal: Return mobility to safest level of function  9/16/2022 1100 by Jeni Mock RN  Outcome: Progressing  9/15/2022 2315 by Fauzia Davis RN  Outcome: Progressing  Flowsheets (Taken 9/15/2022 2138)  Return Mobility to Safest Level of Function: Assess patient stability and activity tolerance for standing, transferring and ambulating with or without assistive devices     Problem: Genitourinary - Adult  Goal: Absence of urinary retention  9/16/2022 1100 by Nunu Abbasi RN  Outcome: Progressing  9/15/2022 2315 by Alise Wilson RN  Outcome: Progressing  Flowsheets (Taken 9/15/2022 2138)  Absence of urinary retention: Monitor intake/output and perform bladder scan as needed

## 2022-09-16 NOTE — CONSULTS
Consulting Physician: Cher HOBBS    Reason for Consult: [address] with BPH duong urology in past    History of Present Illness: Carrie Mireles is a 80 y.o. male who came to the hospital s/p fall. He was apparently straight catheterized in the ED and developed [address] after this. He states they \"kept jabbing and jabbing my prostate\". Hematuria has now resolved. Purewick catheter is in place with clear/yellow output. He denies any issues voiding at this time.  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     PAD (peripheral artery disease) (HCC)     PAF (paroxysmal atrial fibrillation) ([address]     on Eliquis    Renal cell carcinoma of right kidney ([address] 2014    Unspecified hearing loss     Unspecified pulmonary tuberculosis, confirmation unspecified        Past Surgical History:  Past Surgical History:   Procedure Laterality Date    ANGIOPLASTY  06/2011    drug-eluting stenting of the LAD    BACK SURGERY  05/2015    lumbar fusion    CYSTOSCOPY  01/19/2019    CYSTOSCOPY EVACUATION OF CLOTS, fulgeration of prostate    CYSTOSCOPY N/A 01/19/2019    CYSTOSCOPY EVACUATION OF CLOTS, fulgeration of prostate performed by Iain Lewis DO at [address] Right 08/03/2010    Right above knee popliteal to posterior tibial artery bypass graft with non reversed translocated great saphenous vein    RADIOFREQUENCY ABLATION KIDNEY Right 2014    Dr. Luz Cullen      chest area       Social History:  Social History     Socioeconomic History    Marital status:      Spouse name: Not on file    Number of children: Not on file    Years of education: Not on file    Highest education level: Not on file   Occupational History    Not on file   Tobacco Use    Smoking status: Former     Types: Cigarettes     Quit date: 1962     Years since quittin.2    Smokeless tobacco: Never   Vaping Use    Vaping Use: Never used   Substance and Sexual Activity    Alcohol use: Not on file    Drug use: No    Sexual activity: Never   Other Topics Concern    Not on file   Social History Narrative    Not on file     Social Determinants of Health     Financial Resource Strain: Not on file   Food Insecurity: Not on file   Transportation Needs: Not on file   Physical Activity: Not on file   Stress: Not on file   Social Connections: Not on file   Intimate Partner Violence: Not on file   Housing Stability: Not on file       Family History:  History reviewed. No pertinent family history.     Meds:   Current Facility-Administered Medications: sodium chloride flush 0.9 % injection 5-40 mL, 5-40 mL, IntraVENous, 2 times per day  sodium chloride flush 0.9 % injection 5-40 mL, 5-40 mL, IntraVENous, PRN  0.9 % sodium chloride infusion, , IntraVENous, PRN  potassium chloride (KLOR-CON M) extended release tablet 40 mEq, 40 mEq, Oral, PRN **OR** potassium bicarb-citric acid (EFFER-K) effervescent tablet 40 mEq, 40 mEq, Oral, PRN **OR** potassium chloride 10 mEq/100 mL IVPB (Peripheral Line), 10 mEq, IntraVENous, PRN  magnesium sulfate 2000 mg in 50 mL IVPB premix, 2,000 mg, IntraVENous, PRN  promethazine (PHENERGAN) tablet 12.5 mg, 12.5 mg, Oral, Q6H PRN **OR** ondansetron (ZOFRAN) injection 4 mg, 4 mg, IntraVENous, Q6H PRN  magnesium hydroxide (MILK OF MAGNESIA) 400 MG/5ML suspension 30 mL, 30 mL, Oral, Daily PRN  acetaminophen (TYLENOL) tablet 650 mg, 650 mg, Oral, Q6H PRN **OR** acetaminophen (TYLENOL) suppository 650 mg, 650 mg, Rectal, Q6H PRN  ciprofloxacin (CIPRO) IVPB 400 mg, 400 mg, IntraVENous, Q12H  metronidazole (FLAGYL) 500 mg in 0.9% NaCl 100 mL IVPB premix, 500 mg, IntraVENous, Q8H  0.9 % sodium chloride infusion, , IntraVENous, Continuous  [Held by provider] apixaban (ELIQUIS) tablet 2.5 mg, 2.5 mg, Oral, BID  atorvastatin (LIPITOR) tablet 40 mg, 40 mg, Oral, Nightly  finasteride (PROSCAR) tablet 5 mg, 5 mg, Oral, Daily  famotidine (PEPCID) tablet 20 mg, 20 mg, Oral, Nightly  fluticasone (FLONASE) 50 MCG/ACT nasal spray 2 spray, 2 spray, Nasal, Daily  gabapentin (NEURONTIN) capsule 100 mg, 100 mg, Oral, BID  NIFEdipine (PROCARDIA XL) extended release tablet 30 mg, 30 mg, Oral, Daily  metoprolol succinate (TOPROL XL) extended release tablet 50 mg, 50 mg, Oral, Daily  lisinopril (PRINIVIL;ZESTRIL) tablet 10 mg, 10 mg, Oral, Daily  tamsulosin (FLOMAX) capsule 0.8 mg, 0.8 mg, Oral, Nightly  cetirizine (ZYRTEC) tablet 5 mg, 5 mg, Oral, Daily    Review of Systems:  10 Systems were reviewed and negative except as in HPI    Vitals:  BP (!) 123/56   Pulse 81   Temp 97.9 °F (36.6 °C) (Oral)   Resp 18   Wt 163 lb 9.3 oz (74.2 kg)   SpO2 94%   BMI 22.81 kg/m²     Intake/Output Summary (Last 24 hours) at 9/16/2022 1127  Last data filed at 9/16/2022 0453  Gross per 24 hour   Intake 2363.98 ml   Output 1550 ml   Net 813.98 ml       Physical Exam:  General Appearance: Alert and oriented, cooperative, no distress, appears stated age  Head: Normocephalic, without obvious abnormality, atraumatic  Back: no CVA tenderness  Lungs: respirations unlabored, no wheezing  Heart: Regular rate and rhythm, no lower extremity edema noted  Abdomen: Soft, non-tender, non-distended, no masses  Skin: Skin color, texture, turgor normal, no rashes or lesions  Neurologic: no gross deficits  Male :  Nonpalpable bladder  No CVA tenderness  Spontaneously voiding with purewick in place  YAMILA Not indicated    Labs:  CBC   Lab Results   Component Value Date/Time    WBC 9.6 09/16/2022 06:10 AM    RBC 3.25 09/16/2022 06:10 AM    HGB 10.8 09/16/2022 06:10 AM    HCT 31.2 09/16/2022 06:10 AM    MCV 95.9 09/16/2022 06:10 AM    MCH 33.1 09/16/2022 06:10 AM    MCHC 34.5 09/16/2022 06:10 AM    RDW 14.4 09/16/2022 06:10 AM     09/16/2022 06:10 AM    MPV 9.3 09/16/2022 06:10 AM     BMP   Lab Results   Component Value Date/Time     09/16/2022 06:10 AM    K 4.2 09/16/2022 06:10 AM     09/16/2022 06:10 AM    CO2 24 09/16/2022 06:10 AM    BUN 15 09/16/2022 06:10 AM    CREATININE 1.0 09/16/2022 06:10 AM    GLUCOSE 90 09/16/2022 06:10 AM    CALCIUM 7.9 09/16/2022 06:10 AM       Urinalysis:   Lab Results   Component Value Date/Time    COLORU RED 09/14/2022 02:36 PM    GLUCOSEU Negative 09/14/2022 02:36 PM    BLOODU LARGE 09/14/2022 02:36 PM    NITRU Negative 09/14/2022 02:36 PM    LEUKOCYTESUR MODERATE 09/14/2022 02:36 PM       Imaging:  Pertinent images and radiologist's report were reviewed independently  CT chest/abdomen/pelvis 9/14/22  Impression   1. No acute findings in the chest.  Dependent opacities in the lower lobes   are believed to represent atelectasis. 2. Thickening of the cecum and of the rectum favored to represent   proctocolitis. Underlying malignancy in these areas cannot be excluded. 3. Severe prostatomegaly and thickening of the bladder which could reflect   associated hypertrophy versus cystitis. 4. Mass in the mediastinum posterior to the distal aspect of the trachea   measuring up to 5.2 cm. The mass is relatively low attenuation. Given its   location and attenuation, a bronchogenic cyst is favored over other   possibility such as lymphadenopathy. A mass arising from the esophagus is   considered significantly less likely. The mass causes mass effect with   narrowing of the trachea and the right bronchus. 5. Colonic diverticulosis. Impression/Plan:   - 89y. o. male admitted s/p fall. Consulted for gross hematuria. - GH has resolved - likely trauma from catheter   - Urine culture pending, continue antibiotics.   - f/u with [facility]     FABY Goldman - CNP 9/16/202211:27 AM

## 2022-09-16 NOTE — ACP (ADVANCE CARE PLANNING)
Advance Care Planning     Advance Care Planning Activator (Inpatient)  Conversation Note      Date of ACP Conversation: 9/16/2022     Conversation Conducted with: Patient with Decision Making Capacity    ACP Activator: Darius Tamez RN    Health Care Decision Maker:     Current Designated Health Care Decision Maker:     Primary Decision Maker: Poncho Elziabeth - Child - [phone]    Primary Decision Maker: Zonia Valenzuela - Child - [phone]    Care Preferences    Ventilation: \"If you were in your present state of health and suddenly became very ill and were unable to breathe on your own, what would your preference be about the use of a ventilator (breathing machine) if it were available to you? \"      Would the patient desire the use of ventilator (breathing machine)?: yes    \"If your health worsens and it becomes clear that your chance of recovery is unlikely, what would your preference be about the use of a ventilator (breathing machine) if it were available to you? \"     Would the patient desire the use of ventilator (breathing machine)?: Unsure      Resuscitation  \"CPR works best to restart the heart when there is a sudden event, like a heart attack, in someone who is otherwise healthy. Unfortunately, CPR does not typically restart the heart for people who have serious health conditions or who are very sick. \"    \"In the event your heart stopped as a result of an underlying serious health condition, would you want attempts to be made to restart your heart (answer \"yes\" for attempt to resuscitate) or would you prefer a natural death (answer \"no\" for do not attempt to resuscitate)? \" yes       [] Yes   [x] No   Educated Patient / Rosaura Alpers regarding differences between Advance Directives and portable DNR orders.     Length of ACP Conversation in minutes: 5     Conversation Outcomes:  [] ACP discussion completed  [] Existing advance directive reviewed with patient; no changes to patient's previously recorded wishes  [] New Advance Directive completed  [] Portable Do Not Rescitate prepared for Provider review and signature  [] POLST/POST/MOLST/MOST prepared for Provider review and signature      Follow-up plan:    [] Schedule follow-up conversation to continue planning  [] Referred individual to Provider for additional questions/concerns   [] Advised patient/agent/surrogate to review completed ACP document and update if needed with changes in condition, patient preferences or care setting    [] This note routed to one or more involved healthcare providers    Marysue Bloch BSN RN  Case Management  [phone]    Electronically signed by Marysue Bloch, RN on 9/16/2022 at 7:15 PM

## 2022-09-17 LAB
ANION GAP SERPL CALCULATED.3IONS-SCNC: 10 MMOL/L (ref 3–16)
BASOPHILS ABSOLUTE: 0 K/UL (ref 0–0.2)
BASOPHILS RELATIVE PERCENT: 0.4 %
BUN BLDV-MCNC: 11 MG/DL (ref 7–20)
CALCIUM SERPL-MCNC: 8.1 MG/DL (ref 8.3–10.6)
CHLORIDE BLD-SCNC: 106 MMOL/L (ref 99–110)
CO2: 20 MMOL/L (ref 21–32)
CREAT SERPL-MCNC: 0.9 MG/DL (ref 0.8–1.3)
EOSINOPHILS ABSOLUTE: 0.2 K/UL (ref 0–0.6)
EOSINOPHILS RELATIVE PERCENT: 2 %
GFR AFRICAN AMERICAN: >60
GFR NON-AFRICAN AMERICAN: >60
GLUCOSE BLD-MCNC: 100 MG/DL (ref 70–99)
HCT VFR BLD CALC: 33.6 % (ref 40.5–52.5)
HEMOGLOBIN: 11.5 G/DL (ref 13.5–17.5)
LYMPHOCYTES ABSOLUTE: 0.6 K/UL (ref 1–5.1)
LYMPHOCYTES RELATIVE PERCENT: 7.6 %
MCH RBC QN AUTO: 32.8 PG (ref 26–34)
MCHC RBC AUTO-ENTMCNC: 34.4 G/DL (ref 31–36)
MCV RBC AUTO: 95.5 FL (ref 80–100)
MONOCYTES ABSOLUTE: 0.8 K/UL (ref 0–1.3)
MONOCYTES RELATIVE PERCENT: 10.6 %
NEUTROPHILS ABSOLUTE: 6 K/UL (ref 1.7–7.7)
NEUTROPHILS RELATIVE PERCENT: 79.4 %
PDW BLD-RTO: 14 % (ref 12.4–15.4)
PLATELET # BLD: 278 K/UL (ref 135–450)
PMV BLD AUTO: 8.6 FL (ref 5–10.5)
POTASSIUM REFLEX MAGNESIUM: 4.3 MMOL/L (ref 3.5–5.1)
RBC # BLD: 3.51 M/UL (ref 4.2–5.9)
SODIUM BLD-SCNC: 136 MMOL/L (ref 136–145)
WBC # BLD: 7.6 K/UL (ref 4–11)

## 2022-09-17 PROCEDURE — 94760 N-INVAS EAR/PLS OXIMETRY 1: CPT

## 2022-09-17 PROCEDURE — 85025 COMPLETE CBC W/AUTO DIFF WBC: CPT

## 2022-09-17 PROCEDURE — 2580000003 HC RX 258: Performed by: INTERNAL MEDICINE

## 2022-09-17 PROCEDURE — 6360000002 HC RX W HCPCS: Performed by: INTERNAL MEDICINE

## 2022-09-17 PROCEDURE — 6370000000 HC RX 637 (ALT 250 FOR IP): Performed by: NURSE PRACTITIONER

## 2022-09-17 PROCEDURE — 1200000000 HC SEMI PRIVATE

## 2022-09-17 PROCEDURE — 36415 COLL VENOUS BLD VENIPUNCTURE: CPT

## 2022-09-17 PROCEDURE — 2500000003 HC RX 250 WO HCPCS: Performed by: INTERNAL MEDICINE

## 2022-09-17 PROCEDURE — 6370000000 HC RX 637 (ALT 250 FOR IP): Performed by: INTERNAL MEDICINE

## 2022-09-17 PROCEDURE — 80048 BASIC METABOLIC PNL TOTAL CA: CPT

## 2022-09-17 RX ORDER — POLYVINYL ALCOHOL 14 MG/ML
1 SOLUTION/ DROPS OPHTHALMIC EVERY 4 HOURS PRN
Status: DISCONTINUED | OUTPATIENT
Start: 2022-09-17 | End: 2022-09-18 | Stop reason: HOSPADM

## 2022-09-17 RX ADMIN — METRONIDAZOLE 500 MG: 500 INJECTION, SOLUTION INTRAVENOUS at 10:04

## 2022-09-17 RX ADMIN — TAMSULOSIN HYDROCHLORIDE 0.8 MG: 0.4 CAPSULE ORAL at 21:15

## 2022-09-17 RX ADMIN — POLYVINYL ALCOHOL 1 DROP: 14 SOLUTION/ DROPS OPHTHALMIC at 18:49

## 2022-09-17 RX ADMIN — FLUTICASONE PROPIONATE 2 SPRAY: 50 SPRAY, METERED NASAL at 09:05

## 2022-09-17 RX ADMIN — METRONIDAZOLE 500 MG: 500 INJECTION, SOLUTION INTRAVENOUS at 18:39

## 2022-09-17 RX ADMIN — METRONIDAZOLE 500 MG: 500 INJECTION, SOLUTION INTRAVENOUS at 02:30

## 2022-09-17 RX ADMIN — CIPROFLOXACIN 400 MG: 2 INJECTION, SOLUTION INTRAVENOUS at 05:55

## 2022-09-17 RX ADMIN — NIFEDIPINE 30 MG: 30 TABLET, EXTENDED RELEASE ORAL at 09:05

## 2022-09-17 RX ADMIN — FAMOTIDINE 20 MG: 20 TABLET, FILM COATED ORAL at 21:15

## 2022-09-17 RX ADMIN — CIPROFLOXACIN 400 MG: 2 INJECTION, SOLUTION INTRAVENOUS at 18:42

## 2022-09-17 RX ADMIN — GABAPENTIN 100 MG: 100 CAPSULE ORAL at 21:15

## 2022-09-17 RX ADMIN — METOPROLOL SUCCINATE 50 MG: 50 TABLET, EXTENDED RELEASE ORAL at 09:05

## 2022-09-17 RX ADMIN — FINASTERIDE 5 MG: 5 TABLET, FILM COATED ORAL at 09:05

## 2022-09-17 RX ADMIN — GABAPENTIN 100 MG: 100 CAPSULE ORAL at 09:05

## 2022-09-17 RX ADMIN — CETIRIZINE HYDROCHLORIDE 5 MG: 10 TABLET, FILM COATED ORAL at 09:05

## 2022-09-17 RX ADMIN — LISINOPRIL 10 MG: 10 TABLET ORAL at 09:05

## 2022-09-17 RX ADMIN — SODIUM CHLORIDE: 9 INJECTION, SOLUTION INTRAVENOUS at 02:29

## 2022-09-17 RX ADMIN — APIXABAN 2.5 MG: 5 TABLET, FILM COATED ORAL at 21:15

## 2022-09-17 RX ADMIN — ATORVASTATIN CALCIUM 40 MG: 40 TABLET, FILM COATED ORAL at 21:15

## 2022-09-17 NOTE — PLAN OF CARE
Problem: Discharge Planning  Goal: Discharge to home or other facility with appropriate resources  9/17/2022 1501 by Narcisa Ritchie RN  Outcome: Progressing  9/17/2022 0200 by Jose Rapp RN  Outcome: Progressing  [address] (Taken 9/16/2022 2015)  Discharge to home or other facility with appropriate resources:   Identify barriers to discharge with patient and caregiver   Arrange for needed discharge resources and transportation as appropriate     Problem: Safety - Adult  Goal: Free from fall injury  9/17/2022 1501 by Narcisa Ritchie RN  Outcome: Progressing  Flowsheets (Taken 9/17/2022 0805)  Free From Fall Injury: Instruct family/caregiver on patient safety  9/17/2022 0200 by Jose Rapp RN  Outcome: Progressing     Problem: ABCDS Injury Assessment  Goal: Absence of physical injury  9/17/2022 1501 by Narcisa Ritchie RN  Outcome: Progressing  Flowsheets (Taken 9/17/2022 0805)  Absence of Physical Injury: Implement safety measures based on patient assessment  9/17/2022 0200 by Jose Rapp RN  Outcome: Progressing  Flowsheets (Taken 9/17/2022 0151)  Absence of Physical Injury: Implement safety measures based on patient assessment     Problem: Skin/Tissue Integrity  Goal: Absence of new skin breakdown  Description: 1. Monitor for areas of redness and/or skin breakdown  2. Assess vascular access sites hourly  3. Every 4-6 hours minimum:  Change oxygen saturation probe site  4. Every 4-6 hours:  If on nasal continuous positive airway pressure, respiratory therapy assess nares and determine need for appliance change or resting period.   9/17/2022 1501 by Narcisa Ritchie RN  Outcome: Progressing  9/17/2022 0200 by Jose Rapp RN  Outcome: Progressing     Problem: Pain  Goal: Verbalizes/displays adequate comfort level or baseline comfort level  9/17/2022 1501 by Narcisa Ritchie RN  Outcome: Progressing  9/17/2022 0200 by Jose Rapp RN  Outcome: Progressing     Problem: Skin/Tissue Integrity - Adult  Goal: Skin integrity remains intact  9/17/2022 1501 by Melvin Lincoln RN  Outcome: Progressing  Flowsheets (Taken 9/17/2022 0805)  Skin Integrity Remains Intact:   Monitor for areas of redness and/or skin breakdown   Assess vascular access sites hourly   Every 4-6 hours minimum: Change oxygen saturation probe site  9/17/2022 0200 by Jermain Hager RN  Outcome: Progressing  Flowsheets  Taken 9/17/2022 0151  Skin Integrity Remains Intact:   Monitor for areas of redness and/or skin breakdown   Assess vascular access sites hourly  Taken 9/16/2022 2015  Skin Integrity Remains Intact:   Monitor for areas of redness and/or skin breakdown   Assess vascular access sites hourly     Problem: Musculoskeletal - Adult  Goal: Return mobility to safest level of function  9/17/2022 1501 by Melvin Lincoln RN  Outcome: Progressing  9/17/2022 0200 by Jermain Hager RN  Outcome: Progressing  Flowsheets (Taken 9/16/2022 2015)  Return Mobility to Safest Level of Function:   Assess patient stability and activity tolerance for standing, transferring and ambulating with or without assistive devices   Assist with transfers and ambulation using safe patient handling equipment as needed   Obtain physical therapy/occupational therapy consults as needed     Problem: Genitourinary - Adult  Goal: Absence of urinary retention  9/17/2022 1501 by Melvin Lincoln RN  Outcome: Progressing  9/17/2022 0200 by Jermain Hager RN  Outcome: Progressing  [address] (Taken 9/16/2022 2015)  Absence of urinary retention:   Monitor intake/output and perform bladder scan as needed   Assess patients ability to void and empty bladder     Problem: Cardiovascular - Adult  Goal: Maintains optimal cardiac output and hemodynamic stability  9/17/2022 1501 by Melvin Lincoln RN  Outcome: Progressing  9/17/2022 0200 by Jermain Hager RN  Outcome: Progressing  Flowsheets (Taken 9/16/2022 2015)  Maintains optimal cardiac output and hemodynamic stability:   Monitor blood pressure and heart rate   Monitor urine output and notify Licensed Independent Practitioner for values outside of normal range   Assess for signs of decreased cardiac output  Goal: Absence of cardiac dysrhythmias or at baseline  9/17/2022 1501 by Toy Tran RN  Outcome: Progressing  9/17/2022 0200 by Emerita Aparicio RN  Outcome: Progressing  Flowsheets (Taken 9/16/2022 2015)  Absence of cardiac dysrhythmias or at baseline:   Assess for signs of decreased cardiac output   Monitor cardiac rate and rhythm   Administer antiarrhythmia medication and electrolyte replacement as ordered     Problem: Infection - Adult  Goal: Absence of infection at discharge  9/17/2022 1501 by Toy Tran RN  Outcome: Progressing  9/17/2022 0200 by Emerita Aparicio RN  Outcome: Progressing  Flowsheets (Taken 9/16/2022 2015)  Absence of infection at discharge:   Assess and monitor for signs and symptoms of infection   Monitor lab/diagnostic results   Monitor all insertion sites i.e., indwelling lines, tubes and drains   Administer medications as ordered

## 2022-09-17 NOTE — PLAN OF CARE
Problem: Discharge Planning  Goal: Discharge to home or other facility with appropriate resources  Outcome: Progressing  Flowsheets (Taken 9/16/2022 2015)  Discharge to home or other facility with appropriate resources:   Identify barriers to discharge with patient and caregiver   Arrange for needed discharge resources and transportation as appropriate     Problem: Safety - Adult  Goal: Free from fall injury  Outcome: Progressing     Problem: ABCDS Injury Assessment  Goal: Absence of physical injury  Outcome: Progressing  Flowsheets (Taken 9/17/2022 0151)  Absence of Physical Injury: Implement safety measures based on patient assessment     Problem: Skin/Tissue Integrity  Goal: Absence of new skin breakdown  Description: 1. Monitor for areas of redness and/or skin breakdown  2. Assess vascular access sites hourly  3. Every 4-6 hours minimum:  Change oxygen saturation probe site  4. Every 4-6 hours:  If on nasal continuous positive airway pressure, respiratory therapy assess nares and determine need for appliance change or resting period.   Outcome: Progressing     Problem: Pain  Goal: Verbalizes/displays adequate comfort level or baseline comfort level  Outcome: Progressing     Problem: Skin/Tissue Integrity - Adult  Goal: Skin integrity remains intact  Outcome: Progressing  Flowsheets  Taken 9/17/2022 0151  Skin Integrity Remains Intact:   Monitor for areas of redness and/or skin breakdown   Assess vascular access sites hourly  Taken 9/16/2022 2015  Skin Integrity Remains Intact:   Monitor for areas of redness and/or skin breakdown   Assess vascular access sites hourly     Problem: Musculoskeletal - Adult  Goal: Return mobility to safest level of function  Outcome: Progressing  Flowsheets (Taken 9/16/2022 2015)  Return Mobility to Safest Level of Function:   Assess patient stability and activity tolerance for standing, transferring and ambulating with or without assistive devices   Assist with transfers and ambulation using safe patient handling equipment as needed   Obtain physical therapy/occupational therapy consults as needed     Problem: Genitourinary - Adult  Goal: Absence of urinary retention  Outcome: Progressing  Flowsheets (Taken 9/16/2022 2015)  Absence of urinary retention:   Monitor intake/output and perform bladder scan as needed   Assess patients ability to void and empty bladder     Problem: Cardiovascular - Adult  Goal: Maintains optimal cardiac output and hemodynamic stability  Outcome: Progressing  Flowsheets (Taken 9/16/2022 2015)  Maintains optimal cardiac output and hemodynamic stability:   Monitor blood pressure and heart rate   Monitor urine output and notify Licensed Independent Practitioner for values outside of normal range   Assess for signs of decreased cardiac output  Goal: Absence of cardiac dysrhythmias or at baseline  Outcome: Progressing  Flowsheets (Taken 9/16/2022 2015)  Absence of cardiac dysrhythmias or at baseline:   Assess for signs of decreased cardiac output   Monitor cardiac rate and rhythm   Administer antiarrhythmia medication and electrolyte replacement as ordered     Problem: Infection - Adult  Goal: Absence of infection at discharge  Outcome: Progressing  Flowsheets (Taken 9/16/2022 2015)  Absence of infection at discharge:   Assess and monitor for signs and symptoms of infection   Monitor lab/diagnostic results   Monitor all insertion sites i.e., indwelling lines, tubes and drains   Administer medications as ordered

## 2022-09-17 NOTE — FLOWSHEET NOTE
Bed alarm sounding, found standing and confused. Assisted back to bed and gown changed, male pure wick in place. Attempted to reorient, but difficult due to hearing loss. Bed alarm placed back on, will monitor.

## 2022-09-17 NOTE — PROGRESS NOTES
Hospitalist Progress Note      PCP: Brigitte Boswell    Date of Admission: 9/14/2022    Chief Complaint: Abdominal pain, fall at home    Hospital Course: Patient is a 31-year-old male who presented to hospital for generalized weakness, on further evaluation patient mentions he has been feeling weak and had a fall yesterday, patient denies nausea diarrhea constipation dysuria, he also denied dizziness blurred vision numbness tingling sensation before the fall. On further evaluation in the emergency department he was found to be having low-grade fever as well as blood in the urine. He also endorsed abdominal pain especially in the lower abdomen, 7/10 in intensity, nonradiating, improved with pain medication    Subjective: Patient laying in bed, he is hard of hearing. He was able to verbalize a plan to me though. Denies any abdominal pain, chest pain, shortness of breath, nausea vomiting, headache or lightheadedness. Reviewed plan of care, deny further needs or questions. Called and spoke to his daughter Issac Farooq on the phone, reviewed plan of care with her as well, denied other questions or concerns. Did call social work to discuss discharge planning as to which facility patient is to go to. Voicemail left. Assessment/Plan:    Proctocolitis  Abdominal pain  -Abdominal pain has resolved  -CT abnormal with thickening of rectum and cecum  -GI has signed off. Recommend follow-up in 2 weeks. Will need outpatient CT in 4 weeks.  -Patient and family do not want to pursue any further colonoscopies given his age and risk factors.  -We will discharge on oral antibiotics once able.   -Continue Cipro and Flagyl    Sepsis present on admission  -Secondary to proctocolitis  -Criteria: Fever, leukocytosis  -Lactic acid normalized  -Treated with IV fluids and resolved    UTI  -Urine culture with E. 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   -Resume Eliquis      Generalized weakness  Fall  -PT OT score 17/24  -Family is moving patient into assisted living on Monday, this was already arranged prior to admission.  -Asked social work to review if assisted living can meet patient's needs, or if he needs higher skilled facility    Traumatic rhabdomyolysis  -Resolved    Elevated troponin  -Likely secondary to sepsis  -no chest pain    Active Hospital Problems    Diagnosis     Mediastinal mass [J98.59]      Priority: Medium    Abdominal pain [R10.9]      Priority: Medium       Medications:  Reviewed    Infusion Medications    sodium chloride       Scheduled Medications    sodium chloride flush  5-40 mL IntraVENous 2 times per day    ciprofloxacin  400 mg IntraVENous Q12H    metroNIDAZOLE  500 mg IntraVENous Q8H    apixaban  2.5 mg Oral BID    atorvastatin  40 mg Oral Nightly    finasteride  5 mg Oral Daily    famotidine  20 mg Oral Nightly    fluticasone  2 spray Nasal Daily    gabapentin  100 mg Oral BID    NIFEdipine  30 mg Oral Daily    metoprolol succinate  50 mg Oral Daily    lisinopril  10 mg Oral Daily    tamsulosin  0.8 mg Oral Nightly    cetirizine  5 mg Oral Daily     PRN Meds: polyvinyl alcohol, sodium chloride flush, sodium chloride, potassium chloride **OR** potassium alternative oral replacement **OR** potassium chloride, magnesium sulfate, promethazine **OR** ondansetron, magnesium hydroxide, acetaminophen **OR** acetaminophen      Intake/Output Summary (Last 24 hours) at 9/17/2022 1332  Last data filed at 9/17/2022 0906  Gross per 24 hour   Intake 2889.58 ml   Output 3300 ml   Net -410.42 ml       Physical Exam Performed:    BP (!) 161/80   Pulse 64   Temp 97.3 °F (36.3 °C) (Oral)   Resp 17   Wt 169 lb 15.6 oz (77.1 kg)   SpO2 93%   BMI 23.71 kg/m²     General appearance: No apparent distress, appears stated age and cooperative. HEENT: Pupils equal, round, and reactive to light. Conjunctivae/corneas clear. Hard of hearing  Neck: Supple, with full range of motion. No jugular venous distention. Trachea midline. Respiratory:  Normal respiratory effort. Clear to auscultation, bilaterally without Rales/Wheezes/Rhonchi. Cardiovascular: Regular rate and rhythm with normal S1/S2 without murmurs, rubs or gallops. Abdomen: Soft, non-tender, non-distended with normal bowel sounds. Musculoskeletal: No clubbing, cyanosis or edema bilaterally. Full range of motion without deformity. Skin: Skin color, texture, turgor normal.  No rashes or lesions. Neurologic:  Neurovascularly intact without any focal sensory/motor deficits. Cranial nerves: II-XII intact, grossly non-focal.  Psychiatric: Alert and oriented, thought content appropriate, normal insight  Capillary Refill: Brisk,< 3 seconds   Peripheral Pulses: +2 palpable, equal bilaterally       Labs:   Recent Labs     09/15/22  0717 09/16/22  0610 09/17/22  0807   WBC 16.6* 9.6 7.6   HGB 10.4* 10.8* 11.5*   HCT 30.8* 31.2* 33.6*    221 278     Recent Labs     09/15/22  0717 09/16/22  0610 09/17/22  0807    137 136   K 4.1 4.2 4.3    107 106   CO2 23 24 20*   BUN 16 15 11   CREATININE 1.1 1.0 0.9   CALCIUM 7.9* 7.9* 8.1*     No results for input(s): AST, ALT, BILIDIR, BILITOT, ALKPHOS in the last 72 hours. No results for input(s): INR in the last 72 hours.   Recent Labs     09/14/22  2146 09/15/22  0717   CKTOTAL 609* 328*       Urinalysis:      Lab Results   Component Value Date/Time    NITRU Negative 09/14/2022 02:36 PM    WBCUA see below 09/14/2022 02:36 PM    BACTERIA Rare 09/14/2022 02:36 PM    RBCUA >100 09/14/2022 02:36 PM    BLOODU LARGE 09/14/2022 02:36 PM    SPECGRAV >=1.030 09/14/2022 02:36 PM    GLUCOSEU Negative 09/14/2022 02:36 PM       Radiology:  CT CHEST ABDOMEN PELVIS W CONTRAST   Final Result   1. No acute findings in the chest.  Dependent opacities in the lower lobes   are believed to represent atelectasis. 2. Thickening of the cecum and of the rectum favored to represent   proctocolitis. Underlying malignancy in these areas cannot be excluded. 3. Severe prostatomegaly and thickening of the bladder which could reflect   associated hypertrophy versus cystitis. 4. Mass in the mediastinum posterior to the distal aspect of the trachea   measuring up to 5.2 cm. The mass is relatively low attenuation. Given its   location and attenuation, a bronchogenic cyst is favored over other   possibility such as lymphadenopathy. A mass arising from the esophagus is   considered significantly less likely. The mass causes mass effect with   narrowing of the trachea and the right bronchus. 5. Colonic diverticulosis. XR HIP 2-3 VW W PELVIS RIGHT   Final Result   No acute abnormality of the hip. If there is persistent clinical suspicion   for radiographically occult fracture and the patient is unable to bear   weight, recommend MRI. XR CHEST PORTABLE   Final Result   Airways disease. CT HEAD WO CONTRAST   Final Result   No acute intracranial abnormality. CT CERVICAL SPINE WO CONTRAST   Final Result   1. No evidence cervical spine fracture. 2. Prominent multilevel spinal degenerative changes with areas of central   canal and osseous neural foraminal narrowing as described. 3. Mild anterolisthesis at the C7-T1 and T1-T2 levels which is likely   degenerative. DVT Prophylaxis: Eliquis  Diet: ADULT DIET;  Regular  Code Status: Full Code    PT/OT Eval Status: Recommend SNF    Dispo -discharge once medically stable, awaiting urine sensitivity results    Genevieve Blunt, APRN - CNP      NOTE:  This report was transcribed using voice recognition software. Every effort was made to ensure accuracy; however, inadvertent computerized transcription errors may be present.

## 2022-09-18 VITALS
DIASTOLIC BLOOD PRESSURE: 66 MMHG | RESPIRATION RATE: 16 BRPM | HEIGHT: 71 IN | HEART RATE: 71 BPM | BODY MASS INDEX: 23.3 KG/M2 | WEIGHT: 166.45 LBS | OXYGEN SATURATION: 94 % | SYSTOLIC BLOOD PRESSURE: 163 MMHG | TEMPERATURE: 98 F

## 2022-09-18 LAB
ANION GAP SERPL CALCULATED.3IONS-SCNC: 13 MMOL/L (ref 3–16)
BASOPHILS ABSOLUTE: 0.1 K/UL (ref 0–0.2)
BASOPHILS RELATIVE PERCENT: 0.7 %
BLOOD CULTURE, ROUTINE: NORMAL
BUN BLDV-MCNC: 11 MG/DL (ref 7–20)
CALCIUM SERPL-MCNC: 8.4 MG/DL (ref 8.3–10.6)
CHLORIDE BLD-SCNC: 106 MMOL/L (ref 99–110)
CO2: 20 MMOL/L (ref 21–32)
CREAT SERPL-MCNC: 1 MG/DL (ref 0.8–1.3)
CULTURE, BLOOD 2: NORMAL
EOSINOPHILS ABSOLUTE: 0.2 K/UL (ref 0–0.6)
EOSINOPHILS RELATIVE PERCENT: 3.2 %
GFR AFRICAN AMERICAN: >60
GFR NON-AFRICAN AMERICAN: >60
GLUCOSE BLD-MCNC: 144 MG/DL (ref 70–99)
HCT VFR BLD CALC: 39.3 % (ref 40.5–52.5)
HEMOGLOBIN: 13.4 G/DL (ref 13.5–17.5)
LYMPHOCYTES ABSOLUTE: 0.6 K/UL (ref 1–5.1)
LYMPHOCYTES RELATIVE PERCENT: 8.6 %
MCH RBC QN AUTO: 32.9 PG (ref 26–34)
MCHC RBC AUTO-ENTMCNC: 34 G/DL (ref 31–36)
MCV RBC AUTO: 96.6 FL (ref 80–100)
MONOCYTES ABSOLUTE: 0.4 K/UL (ref 0–1.3)
MONOCYTES RELATIVE PERCENT: 5.6 %
NEUTROPHILS ABSOLUTE: 5.9 K/UL (ref 1.7–7.7)
NEUTROPHILS RELATIVE PERCENT: 81.9 %
ORGANISM: ABNORMAL
PDW BLD-RTO: 14.3 % (ref 12.4–15.4)
PLATELET # BLD: 295 K/UL (ref 135–450)
PMV BLD AUTO: 9.3 FL (ref 5–10.5)
POTASSIUM REFLEX MAGNESIUM: 4.1 MMOL/L (ref 3.5–5.1)
RBC # BLD: 4.07 M/UL (ref 4.2–5.9)
SODIUM BLD-SCNC: 139 MMOL/L (ref 136–145)
URINE CULTURE, ROUTINE: ABNORMAL
WBC # BLD: 7.2 K/UL (ref 4–11)

## 2022-09-18 PROCEDURE — 6360000002 HC RX W HCPCS: Performed by: INTERNAL MEDICINE

## 2022-09-18 PROCEDURE — 80048 BASIC METABOLIC PNL TOTAL CA: CPT

## 2022-09-18 PROCEDURE — 2500000003 HC RX 250 WO HCPCS: Performed by: INTERNAL MEDICINE

## 2022-09-18 PROCEDURE — 94760 N-INVAS EAR/PLS OXIMETRY 1: CPT

## 2022-09-18 PROCEDURE — 36415 COLL VENOUS BLD VENIPUNCTURE: CPT

## 2022-09-18 PROCEDURE — 6370000000 HC RX 637 (ALT 250 FOR IP): Performed by: INTERNAL MEDICINE

## 2022-09-18 PROCEDURE — 85025 COMPLETE CBC W/AUTO DIFF WBC: CPT

## 2022-09-18 RX ORDER — METRONIDAZOLE 500 MG/1
500 TABLET ORAL 3 TIMES DAILY
Qty: 12 TABLET | Refills: 0 | Status: SHIPPED | OUTPATIENT
Start: 2022-09-18 | End: 2022-09-22

## 2022-09-18 RX ORDER — CIPROFLOXACIN 500 MG/1
500 TABLET, FILM COATED ORAL 2 TIMES DAILY
Qty: 8 TABLET | Refills: 0 | Status: SHIPPED | OUTPATIENT
Start: 2022-09-18 | End: 2022-09-22

## 2022-09-18 RX ADMIN — POLYVINYL ALCOHOL 1 DROP: 14 SOLUTION/ DROPS OPHTHALMIC at 08:30

## 2022-09-18 RX ADMIN — LISINOPRIL 10 MG: 10 TABLET ORAL at 08:30

## 2022-09-18 RX ADMIN — GABAPENTIN 100 MG: 100 CAPSULE ORAL at 08:30

## 2022-09-18 RX ADMIN — CETIRIZINE HYDROCHLORIDE 5 MG: 10 TABLET, FILM COATED ORAL at 08:30

## 2022-09-18 RX ADMIN — FINASTERIDE 5 MG: 5 TABLET, FILM COATED ORAL at 08:30

## 2022-09-18 RX ADMIN — METOPROLOL SUCCINATE 50 MG: 50 TABLET, EXTENDED RELEASE ORAL at 08:30

## 2022-09-18 RX ADMIN — APIXABAN 2.5 MG: 5 TABLET, FILM COATED ORAL at 08:30

## 2022-09-18 RX ADMIN — FLUTICASONE PROPIONATE 2 SPRAY: 50 SPRAY, METERED NASAL at 08:30

## 2022-09-18 RX ADMIN — METRONIDAZOLE 500 MG: 500 INJECTION, SOLUTION INTRAVENOUS at 09:31

## 2022-09-18 RX ADMIN — NIFEDIPINE 30 MG: 30 TABLET, EXTENDED RELEASE ORAL at 08:30

## 2022-09-18 RX ADMIN — CIPROFLOXACIN 400 MG: 2 INJECTION, SOLUTION INTRAVENOUS at 05:46

## 2022-09-18 RX ADMIN — METRONIDAZOLE 500 MG: 500 INJECTION, SOLUTION INTRAVENOUS at 02:09

## 2022-09-18 NOTE — PROGRESS NOTES
Patient discharged with daughter Abimbola Nieto to [facility] living. Reviewed discharge instructions with daughter. Call to Pi RN at [facility] to notify that patient is on the way.

## 2022-09-18 NOTE — FLOWSHEET NOTE
Confused, alarm sounding. Naked, pulled IV out, purewick off, and cardiac monitor. Stated he was getting in the shower. Oriented and assisted back to bed. Monitor, purewick, and IV replaced. Alarm on for safety.

## 2022-09-18 NOTE — DISCHARGE INSTR - COC
Infection Status       Infection Onset Added Last Indicated Last Indicated By Review Planned Expiration Resolved Resolved By    None active    Resolved    COVID-19 (Rule Out) 09/14/22 09/14/22 09/14/22 COVID-19, Rapid (Ordered)   09/14/22 Rule-Out Test Resulted            Nurse Assessment:  Last Vital Signs: BP (!) 163/66   Pulse 71   Temp 98 °F (36.7 °C) (Axillary)   Resp 16   Ht 5' 11\" (1.803 m)   Wt 166 lb 7.2 oz (75.5 kg)   SpO2 94%   BMI 23.21 kg/m²     Last documented pain score (0-10 scale): Pain Level: 1  Last Weight:   Wt Readings from Last 1 Encounters:   09/18/22 166 lb 7.2 oz (75.5 kg)     Mental Status:  oriented, alert, coherent, logical, thought processes intact, and able to concentrate and follow conversation    IV Access:  - None    Nursing Mobility/ADLs:  Walking   Assisted  Transfer  Assisted  Bathing  Assisted  Dressing  Assisted  Toileting  Assisted  Feeding  [address]  Assisted  Med Delivery   whole    Wound Care Documentation and Therapy:        Elimination:  Continence: Bowel: Yes  Bladder: No  Urinary Catheter: None   Colostomy/Ileostomy/Ileal Conduit: No       Date of Last BM: 9/14/22    Intake/Output Summary (Last 24 hours) at 9/18/2022 0853  Last data filed at 9/18/2022 1512  Gross per 24 hour   Intake 2127.17 ml   Output 3350 ml   Net -1222.83 ml     I/O last 3 completed shifts: In: 4656.8 [P.O.:1140; I.V.:2132.3; IV Piggyback:1384.5]  Out: 4750 [Urine:4750]    Safety Concerns:     History of Falls (last 30 days)    Impairments/Disabilities:      Vision and Hearing    Nutrition Therapy:  Current Nutrition Therapy:   - Oral Diet:  General    Routes of Feeding: Oral  Liquids: Thin Liquids  Daily Fluid Restriction: no  Last Modified Barium Swallow with Video (Video Swallowing Test): not done    Treatments at the Time of Hospital Discharge:   Respiratory Treatments: ***  Oxygen Therapy:  is not on home oxygen therapy.   Ventilator:    - No ventilator support    Rehab Therapies: Physical Therapy and Occupational Therapy  Weight Bearing Status/Restrictions: No weight bearing restrictions  Other Medical Equipment (for information only, NOT a DME order):  walker  Other Treatments: ***    Patient's personal belongings (please select all that are sent with patient):  Glasses, Hearing Aides right, Dentures upper    RN SIGNATURE:  Electronically signed by Brennan Leon RN on 9/18/22 at 10:10 AM EDT    CASE MANAGEMENT/SOCIAL WORK SECTION    Inpatient Status Date: ***    Readmission Risk Assessment Score:  Readmission Risk              Risk of Unplanned Readmission:  15           Discharging to Facility/ Agency   Name: [facility]  Phone: [phone]  Report: 21 [phone]        / signature: Electronically signed by Samaria Sequeira RN on 9/18/22 at 9:07 AM EDT    PHYSICIAN SECTION    Prognosis: Good    Condition at Discharge: Stable    Rehab Potential (if transferring to Rehab): Good    Recommended Labs or Other Treatments After Discharge: rn pt ot    Physician Certification: I certify the above information and transfer of Mik Wong  is necessary for the continuing treatment of the diagnosis listed and that he requires Home Care for greater 30 days.      Update Admission H&P: No change in H&P    PHYSICIAN SIGNATURE:  Electronically signed by FABY Lassiter CNP on 9/18/22 at 8:54 AM EDT

## 2022-09-18 NOTE — DISCHARGE SUMMARY
Hospital Medicine Discharge Summary    Patient ID: Man Mendiola      Patient's PCP: Donivan Epley    Admit Date: 9/14/2022     Discharge Date: 9/18/2022      Admitting Physician: Anjali Tan MD     Discharge Physician: FABY Sargent - CNP     Discharge Diagnoses  Proctocolitis  Abdominal pain  Sepsis  UTI, E. 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 On further evaluation in the emergency department he was found to be having low-grade fever as well as blood in the urine. He also endorsed abdominal pain especially in the lower abdomen, 7/10 in intensity, nonradiating, improved with pain medication    Proctocolitis  Abdominal pain  -Abdominal pain has resolved  -CT abnormal with thickening of rectum and cecum  -GI has signed off. Recommend follow-up in 2 weeks.   Will need outpatient CT in 4 weeks.  -Patient and family do not want to pursue any further colonoscopies given his age and risk factors.  -We will discharge on oral antibiotics once able  -Treated with Cipro and Flagyl, continue p.o. to complete 7-day course     Sepsis present on admission  -Secondary to proctocolitis  -Criteria: Fever, leukocytosis  -Lactic acid normalized  -Treated with IV fluids and resolved     UTI  -Urine culture with E. 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 -Resume Eliquis        Generalized weakness  Fall  -PT OT score 17/24  -Discharged to assisted living    Traumatic rhabdomyolysis  -Resolved     Elevated troponin  -Likely secondary to sepsis  -no chest pain         Patient stated they felt well and wanted to go home. Patient denied chest pain, shortness of breath, palpitations, abdominal pain, nausea vomiting, diarrhea, dysuria, headache lightheadedness or dizziness. Appetite good. Voiding without difficulty. Reviewed plan of care with patient, verbalized understanding and agreement. Called and spoke to daughter Juan José Holliday on the phone, reviewed plan of care, questions answered. She denied any other questions or needs. Denied further questions or needs. Physical Exam Performed:     BP (!) 163/66   Pulse 71   Temp 98 °F (36.7 °C) (Axillary)   Resp 16   Ht 5' 11\" (1.803 m)   Wt 166 lb 7.2 oz (75.5 kg)   SpO2 94%   BMI 23.21 kg/m²       General appearance:  No apparent distress, appears stated age and cooperative. HEENT:  Normal cephalic, atraumatic without obvious deformity. Pupils equal, round, and reactive to light. Extra ocular muscles intact. Conjunctivae/corneas clear. Neck: Supple, with full range of motion. No jugular venous distention. Trachea midline. Respiratory:  Normal respiratory effort. Clear to auscultation, bilaterally without Rales/Wheezes/Rhonchi. Cardiovascular:  Regular rate and rhythm with normal S1/S2 without murmurs, rubs or gallops. Abdomen: Soft, non-tender, non-distended with normal bowel sounds. Musculoskeletal:  No clubbing, cyanosis or edema bilaterally.   Full range of motion without deformity. Skin: Skin color, texture, turgor normal.  No rashes or lesions. Neurologic:  Neurovascularly intact without any focal sensory/motor deficits. Cranial nerves: II-XII intact, grossly non-focal.  Psychiatric:  Alert and oriented, thought content appropriate, normal insight  Capillary Refill: Brisk,< 3 seconds   Peripheral Pulses: +2 palpable, equal bilaterally       Labs: For convenience and continuity at follow-up the following most recent labs are provided:      CBC:    Lab Results   Component Value Date/Time    WBC 7.2 09/18/2022 09:05 AM    HGB 13.4 09/18/2022 09:05 AM    HCT 39.3 09/18/2022 09:05 AM     09/18/2022 09:05 AM       Renal:    Lab Results   Component Value Date/Time     09/18/2022 09:05 AM    K 4.1 09/18/2022 09:05 AM     09/18/2022 09:05 AM    CO2 20 09/18/2022 09:05 AM    BUN 11 09/18/2022 09:05 AM    CREATININE 1.0 09/18/2022 09:05 AM    CALCIUM 8.4 09/18/2022 09:05 AM         Significant Diagnostic Studies    Radiology:   CT CHEST ABDOMEN PELVIS W CONTRAST   Final Result   1. No acute findings in the chest.  Dependent opacities in the lower lobes   are believed to represent atelectasis. 2. Thickening of the cecum and of the rectum favored to represent   proctocolitis. Underlying malignancy in these areas cannot be excluded. 3. Severe prostatomegaly and thickening of the bladder which could reflect   associated hypertrophy versus cystitis. 4. Mass in the mediastinum posterior to the distal aspect of the trachea   measuring up to 5.2 cm. The mass is relatively low attenuation. Given its   location and attenuation, a bronchogenic cyst is favored over other   possibility such as lymphadenopathy. A mass arising from the esophagus is   considered significantly less likely. The mass causes mass effect with   narrowing of the trachea and the right bronchus. 5. Colonic diverticulosis.          XR HIP 2-3 VW W PELVIS RIGHT   Final Result   No acute abnormality of the hip. If there is persistent clinical suspicion   for radiographically occult fracture and the patient is unable to bear   weight, recommend MRI. XR CHEST PORTABLE   Final Result   Airways disease. CT HEAD WO CONTRAST   Final Result   No acute intracranial abnormality. CT CERVICAL SPINE WO CONTRAST   Final Result   1. No evidence cervical spine fracture. 2. Prominent multilevel spinal degenerative changes with areas of central   canal and osseous neural foraminal narrowing as described. 3. Mild anterolisthesis at the C7-T1 and T1-T2 levels which is likely   degenerative. Consults:     IP CONSULT TO HOSPITALIST  IP CONSULT TO GI  IP CONSULT TO CARDIOTHORACIC SURGERY  IP CONSULT TO UROLOGY  IP CONSULT TO HOME CARE NEEDS    [facility]    Condition at Discharge: Stable    Discharge Instructions/Follow-up:      Follow up with GI in 2 weeks, Darien Abebe  Repeat CT abd/pelvis in 4 weeks  Follow up with urology at [id number] [facility] as needed  Follow up with pcp in 1 week    Code Status:  Prior     Activity: activity as tolerated    Diet: cardiac diet      Discharge Medications:     Discharge Medication List as of 9/18/2022 10:51 AM             Details   ciprofloxacin (CIPRO) 500 MG tablet Take 1 tablet by mouth 2 times daily for 4 days, Disp-8 tablet, R-0Normal      metroNIDAZOLE (FLAGYL) 500 MG tablet Take 1 tablet by mouth 3 times daily for 4 days, Disp-12 tablet, R-0Normal                Details   gabapentin (NEURONTIN) 100 MG capsule Take 100 mg by mouth 2 times daily. Historical Med      NIFEdipine (ADALAT CC) 30 MG extended release tablet Take 30 mg by mouth dailyHistorical Med      famotidine (PEPCID) 20 MG tablet Take 20 mg by mouth dailyHistorical Med      levocetirizine (XYZAL) 5 MG tablet Take 5 mg by mouth nightlyHistorical Med      apixaban (ELIQUIS) 2.5 MG TABS tablet Take 2.5 mg by mouth 2 times dailyHistorical Med      atorvastatin (LIPITOR) 40 MG tablet Take 40 mg by mouth at bedtimeHistorical Med      clopidogrel (PLAVIX) 75 MG tablet Take 75 mg by mouth dailyHistorical Med      metoprolol succinate (TOPROL XL) 50 MG extended release tablet Take 50 mg by mouthHistorical Med      finasteride (PROSCAR) 5 MG tablet Take 5 mg by mouth dailyHistorical Med      tamsulosin (FLOMAX) 0.4 MG capsule Take 0.8 mg by mouth at bedtimeHistorical Med      polyethylene glycol (GLYCOLAX) powder Take 17 g by mouth daily as neededHistorical Med      fluticasone (FLONASE) 50 MCG/ACT nasal spray 2 sprays by Nasal route dailyHistorical Med      Multiple Vitamins-Minerals (PRESERVISION AREDS 2 PO) Take 1 tablet by mouth 2 times dailyHistorical Med      lisinopril (PRINIVIL;ZESTRIL) 10 MG tablet Take 10 mg by mouth daily. Historical Med             Time Spent on discharge is more than 30 minutes in the examination, evaluation, counseling and review of medications and discharge plan. Signed: Alice Baumgarten, APRN - CNP   9/18/2022    The patient was seen and examined on day of discharge and this discharge summary is in conjunction with any daily progress note from day of discharge. Thank you Lencho Jaffe for the opportunity to be involved in this patient's care. If you have any questions or concerns please feel free to contact me at [phone]. NOTE:  This report was transcribed using voice recognition software. Every effort was made to ensure accuracy; however, inadvertent computerized transcription errors may be present.

## 2022-09-18 NOTE — DISCHARGE INSTRUCTIONS
Follow up with GI in 2 weeks, Carmen Beck  Repeat CT abd/pelvis in 4 weeks  [facility] [address]  Follow up with pcp in 1 week

## 2022-09-18 NOTE — CARE COORDINATION
Talked with Kumar with PT, he feels [place] able to dc to [place]; will verify with [place] if [place] today [place] is doable. Message left with daughter Mehreen Pires to discuss above. Samaria Sequeira RN, BSN,   [phone]  Electronically signed by Samaria Sequeira RN on 9/18/2022 at 8:18 AM     Spoke with daughter Mehreen Pires, pt is good to return to [facility] today, he will either go to a respite room or straight into is [place] room, family will transport. Will call [facility]. Samaria Sequeira RN, BSN,   [phone]  Electronically signed by Samaria Sequeira RN on 9/18/2022 at 8:57 AM     Spoke with a nurse at [facility], they are aware of dc order today & will be ready for him when his family brings him.     CASE MANAGEMENT DISCHARGE SUMMARY:    DISCHARGE DATE: 09/18/22    DISCHARGED TO: [facility]                REPORT NUMBER: [phone]      TRANSPORTATION: family    Samaria Sequeira RN, BSN, Case Management  [phone]    Electronically signed by Samaria Sequeira RN on 9/18/2022 at 9:06 AM

## 2022-09-18 NOTE — PLAN OF CARE
Problem: Discharge Planning  Goal: Discharge to home or other facility with appropriate resources  9/17/2022 2330 by Juliann Fraser RN  Outcome: Progressing  Flowsheets (Taken 9/17/2022 2115)  Discharge to home or other facility with appropriate resources:   Identify barriers to discharge with patient and caregiver   Arrange for needed discharge resources and transportation as appropriate  9/17/2022 1501 by Deo Fisher RN  Outcome: Progressing     Problem: Safety - Adult  Goal: Free from fall injury  9/17/2022 2330 by Juliann Fraser RN  Outcome: Progressing  9/17/2022 1501 by Deo Fisher RN  Outcome: Progressing  Flowsheets (Taken 9/17/2022 0805)  Free From Fall Injury: Instruct family/caregiver on patient safety     Problem: ABCDS Injury Assessment  Goal: Absence of physical injury  9/17/2022 2330 by Juliann Fraser RN  Outcome: Progressing  Flowsheets (Taken 9/17/2022 2315)  Absence of Physical Injury: Implement safety measures based on patient assessment  9/17/2022 1501 by Deo Fisher RN  Outcome: Progressing  Flowsheets (Taken 9/17/2022 0805)  Absence of Physical Injury: Implement safety measures based on patient assessment     Problem: Skin/Tissue Integrity  Goal: Absence of new skin breakdown  Description: 1. Monitor for areas of redness and/or skin breakdown  2. Assess vascular access sites hourly  3. Every 4-6 hours minimum:  Change oxygen saturation probe site  4. Every 4-6 hours:  If on nasal continuous positive airway pressure, respiratory therapy assess nares and determine need for appliance change or resting period.   9/17/2022 2330 by Juliann Fraser RN  Outcome: Progressing  9/17/2022 1501 by Deo Fisher RN  Outcome: Progressing     Problem: Pain  Goal: Verbalizes/displays adequate comfort level or baseline comfort level  9/17/2022 2330 by Juliann Fraser RN  Outcome: Progressing  9/17/2022 1501 by Deo Fisher RN  Outcome: Progressing     Problem: Skin/Tissue Integrity - Adult  Goal: Skin integrity remains intact  9/17/2022 2330 by Ameena Zamarripa RN  Outcome: Progressing  Flowsheets  Taken 9/17/2022 2317  Skin Integrity Remains Intact:   Monitor for areas of redness and/or skin breakdown   Assess vascular access sites hourly  Taken 9/17/2022 2115  Skin Integrity Remains Intact:   Monitor for areas of redness and/or skin breakdown   Assess vascular access sites hourly  9/17/2022 1501 by Anival Araujo RN  Outcome: Progressing  Flowsheets (Taken 9/17/2022 0805)  Skin Integrity Remains Intact:   Monitor for areas of redness and/or skin breakdown   Assess vascular access sites hourly   Every 4-6 hours minimum: Change oxygen saturation probe site     Problem: Musculoskeletal - Adult  Goal: Return mobility to safest level of function  9/17/2022 2330 by Ameena Zamarripa RN  Outcome: Progressing  Flowsheets (Taken 9/17/2022 2115)  Return Mobility to Safest Level of Function:   Assess patient stability and activity tolerance for standing, transferring and ambulating with or without assistive devices   Assist with transfers and ambulation using safe patient handling equipment as needed   Obtain physical therapy/occupational therapy consults as needed   Instruct patient/family in ordered activity level  9/17/2022 1501 by Anival Araujo RN  Outcome: Progressing     Problem: Genitourinary - Adult  Goal: Absence of urinary retention  9/17/2022 2330 by Ameena Zamarripa RN  Outcome: Progressing  Flowsheets (Taken 9/17/2022 2115)  Absence of urinary retention:   Assess patients ability to void and empty bladder   Monitor intake/output and perform bladder scan as needed  9/17/2022 1501 by Anival Araujo RN  Outcome: Progressing     Problem: Cardiovascular - Adult  Goal: Maintains optimal cardiac output and hemodynamic stability  9/17/2022 2330 by Ameena Zamarripa RN  Outcome: Progressing  Flowsheets (Taken 9/17/2022 2115)  Maintains optimal cardiac output and hemodynamic stability:   Monitor blood pressure and heart rate   Monitor urine output and notify Licensed Independent Practitioner for values outside of normal range   Assess for signs of decreased cardiac output  9/17/2022 1501 by Marcus Ryan RN  Outcome: Progressing  Goal: Absence of cardiac dysrhythmias or at baseline  9/17/2022 2330 by Bell Howard RN  Outcome: Progressing  Flowsheets (Taken 9/17/2022 2115)  Absence of cardiac dysrhythmias or at baseline:   Monitor cardiac rate and rhythm   Assess for signs of decreased cardiac output   Administer antiarrhythmia medication and electrolyte replacement as ordered  9/17/2022 1501 by Marcus Ryan RN  Outcome: Progressing     Problem: Infection - Adult  Goal: Absence of infection at discharge  9/17/2022 2330 by Bell Howard RN  Outcome: Progressing  Flowsheets (Taken 9/17/2022 2115)  Absence of infection at discharge:   Assess and monitor for signs and symptoms of infection   Monitor lab/diagnostic results   Monitor all insertion sites i.e., indwelling lines, tubes and drains   Administer medications as ordered   Instruct and encourage patient and family to use good hand hygiene technique  9/17/2022 1501 by Marcus Ryan RN  Outcome: Progressing

## 2022-09-18 NOTE — PLAN OF CARE
Problem: Discharge Planning  Goal: Discharge to home or other facility with appropriate resources  9/18/2022 0943 by Aracelis Fernández RN  Outcome: Adequate for Discharge  9/17/2022 2330 by Shira Arreola RN  Outcome: Progressing  Flowsheets (Taken 9/17/2022 2115)  Discharge to home or other facility with appropriate resources:   Identify barriers to discharge with patient and caregiver   Arrange for needed discharge resources and transportation as appropriate     Problem: Safety - Adult  Goal: Free from fall injury  9/18/2022 0943 by Aracelis Fernández RN  Outcome: Adequate for Discharge  Flowsheets (Taken 9/18/2022 0748)  Free From Fall Injury: Instruct family/caregiver on patient safety  9/17/2022 2330 by Shira Arreola RN  Outcome: Progressing     Problem: ABCDS Injury Assessment  Goal: Absence of physical injury  9/18/2022 0943 by Aracelis Fernández RN  Outcome: Adequate for Discharge  Flowsheets (Taken 9/18/2022 0748)  Absence of Physical Injury: Implement safety measures based on patient assessment  9/17/2022 2330 by Shira Arreola RN  Outcome: Progressing  Flowsheets (Taken 9/17/2022 2315)  Absence of Physical Injury: Implement safety measures based on patient assessment     Problem: Skin/Tissue Integrity  Goal: Absence of new skin breakdown  Description: 1. Monitor for areas of redness and/or skin breakdown  2. Assess vascular access sites hourly  3. Every 4-6 hours minimum:  Change oxygen saturation probe site  4. Every 4-6 hours:  If on nasal continuous positive airway pressure, respiratory therapy assess nares and determine need for appliance change or resting period.   9/18/2022 0943 by Aracelis Fernández RN  Outcome: Adequate for Discharge  9/17/2022 2330 by Shira Arreola RN  Outcome: Progressing     Problem: Pain  Goal: Verbalizes/displays adequate comfort level or baseline comfort level  9/18/2022 0943 by Aracelis Fernández RN  Outcome: Adequate for Discharge  9/17/2022 2330 by Shira Arreola RN  Outcome: Progressing     Problem: Skin/Tissue Integrity - Adult  Goal: Skin integrity remains intact  9/18/2022 0943 by Pop Elaine RN  Outcome: Adequate for Discharge  Flowsheets (Taken 9/18/2022 0749)  Skin Integrity Remains Intact:   Monitor for areas of redness and/or skin breakdown   Assess vascular access sites hourly   Every 4-6 hours minimum: Change oxygen saturation probe site  9/17/2022 2330 by Tori Anna RN  Outcome: Progressing  Flowsheets  Taken 9/17/2022 2317  Skin Integrity Remains Intact:   Monitor for areas of redness and/or skin breakdown   Assess vascular access sites hourly  Taken 9/17/2022 2115  Skin Integrity Remains Intact:   Monitor for areas of redness and/or skin breakdown   Assess vascular access sites hourly     Problem: Musculoskeletal - Adult  Goal: Return mobility to safest level of function  9/18/2022 0943 by Pop Elaine RN  Outcome: Adequate for Discharge  9/17/2022 2330 by Tori Anna RN  Outcome: Progressing  Flowsheets (Taken 9/17/2022 2115)  Return Mobility to Safest Level of Function:   Assess patient stability and activity tolerance for standing, transferring and ambulating with or without assistive devices   Assist with transfers and ambulation using safe patient handling equipment as needed   Obtain physical therapy/occupational therapy consults as needed   Instruct patient/family in ordered activity level     Problem: Genitourinary - Adult  Goal: Absence of urinary retention  9/18/2022 0943 by Pop Elaine RN  Outcome: Adequate for Discharge  9/17/2022 2330 by Tori Anna RN  Outcome: Progressing  [address] (Taken 9/17/2022 2115)  Absence of urinary retention:   Assess patients ability to void and empty bladder   Monitor intake/output and perform bladder scan as needed     Problem: Cardiovascular - Adult  Goal: Maintains optimal cardiac output and hemodynamic stability  9/18/2022 0943 by Pop Elaine RN  Outcome: Adequate for Discharge  9/17/2022 2330 by Sherley Chris RN  Outcome: Progressing  Flowsheets (Taken 9/17/2022 2115)  Maintains optimal cardiac output and hemodynamic stability:   Monitor blood pressure and heart rate   Monitor urine output and notify Licensed Independent Practitioner for values outside of normal range   Assess for signs of decreased cardiac output  Goal: Absence of cardiac dysrhythmias or at baseline  9/18/2022 0943 by Joana Whitehead RN  Outcome: Adequate for Discharge  9/17/2022 2330 by Sherley Chris RN  Outcome: Progressing  [address] (Taken 9/17/2022 2115)  Absence of cardiac dysrhythmias or at baseline:   Monitor cardiac rate and rhythm   Assess for signs of decreased cardiac output   Administer antiarrhythmia medication and electrolyte replacement as ordered     Problem: Infection - Adult  Goal: Absence of infection at discharge  9/18/2022 0943 by Joana Whitehead RN  Outcome: Adequate for Discharge  9/17/2022 2330 by Sherley Chris RN  Outcome: Progressing  Flowsheets (Taken 9/17/2022 2115)  Absence of infection at discharge:   Assess and monitor for signs and symptoms of infection   Monitor lab/diagnostic results   Monitor all insertion sites i.e., indwelling lines, tubes and drains   Administer medications as ordered   Instruct and encourage patient and family to use good hand hygiene technique

## 2023-11-13 ENCOUNTER — HOSPITAL ENCOUNTER (EMERGENCY)
Age: 88
Discharge: HOME | End: 2023-11-14
Attending: EMERGENCY MEDICINE
Payer: OTHER GOVERNMENT

## 2023-11-13 DIAGNOSIS — K08.409 STATUS POST TOOTH EXTRACTION: Primary | ICD-10-CM

## 2023-11-13 DIAGNOSIS — Z79.01 ANTICOAGULATED: ICD-10-CM

## 2023-11-13 DIAGNOSIS — Z98.818 POSTPROCEDURAL HEMORRHAGE DUE TO COMPLICATION OF ORAL SURGERY: ICD-10-CM

## 2023-11-13 DIAGNOSIS — K91.840 POSTPROCEDURAL HEMORRHAGE DUE TO COMPLICATION OF ORAL SURGERY: ICD-10-CM

## 2023-11-13 LAB
ALBUMIN SERPL-MCNC: 3.6 G/DL (ref 3.4–5)
ALBUMIN/GLOB SERPL: 1.6 {RATIO} (ref 1.1–2.2)
ALP SERPL-CCNC: 87 U/L (ref 40–129)
ALT SERPL-CCNC: 11 U/L (ref 10–40)
ANION GAP SERPL CALCULATED.3IONS-SCNC: 10 MMOL/L (ref 3–16)
AST SERPL-CCNC: 21 U/L (ref 15–37)
BASOPHILS # BLD: 0.1 K/UL (ref 0–0.2)
BASOPHILS NFR BLD: 0.9 %
BILIRUB SERPL-MCNC: 0.5 MG/DL (ref 0–1)
BUN SERPL-MCNC: 27 MG/DL (ref 7–20)
CALCIUM SERPL-MCNC: 8.7 MG/DL (ref 8.3–10.6)
CHLORIDE SERPL-SCNC: 105 MMOL/L (ref 99–110)
CO2 SERPL-SCNC: 23 MMOL/L (ref 21–32)
CREAT SERPL-MCNC: 0.9 MG/DL (ref 0.8–1.3)
DEPRECATED RDW RBC AUTO: 14 % (ref 12.4–15.4)
EOSINOPHIL # BLD: 0.2 K/UL (ref 0–0.6)
EOSINOPHIL NFR BLD: 1.5 %
GFR SERPLBLD CREATININE-BSD FMLA CKD-EPI: >60 ML/MIN/{1.73_M2}
GLUCOSE SERPL-MCNC: 87 MG/DL (ref 70–99)
HCT VFR BLD AUTO: 36 % (ref 40.5–52.5)
HGB BLD-MCNC: 12.5 G/DL (ref 13.5–17.5)
LYMPHOCYTES # BLD: 0.9 K/UL (ref 1–5.1)
LYMPHOCYTES NFR BLD: 9 %
MCH RBC QN AUTO: 32.7 PG (ref 26–34)
MCHC RBC AUTO-ENTMCNC: 34.7 G/DL (ref 31–36)
MCV RBC AUTO: 94.2 FL (ref 80–100)
MONOCYTES # BLD: 1.1 K/UL (ref 0–1.3)
MONOCYTES NFR BLD: 10.2 %
NEUTROPHILS # BLD: 8.2 K/UL (ref 1.7–7.7)
NEUTROPHILS NFR BLD: 78.4 %
PLATELET # BLD AUTO: 266 K/UL (ref 135–450)
PMV BLD AUTO: 9.5 FL (ref 5–10.5)
POTASSIUM SERPL-SCNC: 4.7 MMOL/L (ref 3.5–5.1)
PROT SERPL-MCNC: 5.9 G/DL (ref 6.4–8.2)
RBC # BLD AUTO: 3.82 M/UL (ref 4.2–5.9)
SODIUM SERPL-SCNC: 138 MMOL/L (ref 136–145)
WBC # BLD AUTO: 10.4 K/UL (ref 4–11)

## 2023-11-13 PROCEDURE — 36415 COLL VENOUS BLD VENIPUNCTURE: CPT

## 2023-11-13 PROCEDURE — 85025 COMPLETE CBC W/AUTO DIFF WBC: CPT

## 2023-11-13 PROCEDURE — 2500000003 HC RX 250 WO HCPCS: Performed by: EMERGENCY MEDICINE

## 2023-11-13 PROCEDURE — 99283 EMERGENCY DEPT VISIT LOW MDM: CPT

## 2023-11-13 PROCEDURE — 80053 COMPREHEN METABOLIC PANEL: CPT

## 2023-11-13 RX ORDER — LIDOCAINE HYDROCHLORIDE AND EPINEPHRINE 10; 10 MG/ML; UG/ML
20 INJECTION, SOLUTION INFILTRATION; PERINEURAL ONCE
Status: COMPLETED | OUTPATIENT
Start: 2023-11-13 | End: 2023-11-13

## 2023-11-13 RX ORDER — TRANEXAMIC ACID 100 MG/ML
1000 INJECTION, SOLUTION INTRAVENOUS ONCE
Status: COMPLETED | OUTPATIENT
Start: 2023-11-13 | End: 2023-11-13

## 2023-11-13 RX ADMIN — TRANEXAMIC ACID 1000 MG: 100 INJECTION, SOLUTION INTRAVENOUS at 22:00

## 2023-11-13 RX ADMIN — LIDOCAINE HYDROCHLORIDE,EPINEPHRINE BITARTRATE 20 ML: 10; .01 INJECTION, SOLUTION INFILTRATION; PERINEURAL at 23:00

## 2023-11-13 ASSESSMENT — PAIN DESCRIPTION - LOCATION: LOCATION: JAW

## 2023-11-13 ASSESSMENT — PAIN SCALES - GENERAL: PAINLEVEL_OUTOF10: 4

## 2023-11-13 ASSESSMENT — PAIN DESCRIPTION - FREQUENCY: FREQUENCY: CONTINUOUS

## 2023-11-13 ASSESSMENT — PAIN DESCRIPTION - DESCRIPTORS: DESCRIPTORS: SORE

## 2023-11-13 ASSESSMENT — LIFESTYLE VARIABLES
HOW MANY STANDARD DRINKS CONTAINING ALCOHOL DO YOU HAVE ON A TYPICAL DAY: PATIENT DOES NOT DRINK
HOW OFTEN DO YOU HAVE A DRINK CONTAINING ALCOHOL: NEVER

## 2023-11-13 ASSESSMENT — PAIN DESCRIPTION - PAIN TYPE: TYPE: ACUTE PAIN;SURGICAL PAIN

## 2023-11-13 ASSESSMENT — PAIN - FUNCTIONAL ASSESSMENT: PAIN_FUNCTIONAL_ASSESSMENT: 0-10

## 2023-11-14 VITALS
OXYGEN SATURATION: 99 % | RESPIRATION RATE: 21 BRPM | DIASTOLIC BLOOD PRESSURE: 75 MMHG | WEIGHT: 164.68 LBS | BODY MASS INDEX: 22.31 KG/M2 | SYSTOLIC BLOOD PRESSURE: 158 MMHG | TEMPERATURE: 98.2 F | HEART RATE: 81 BPM | HEIGHT: 72 IN

## 2023-11-14 PROCEDURE — 6370000000 HC RX 637 (ALT 250 FOR IP): Performed by: EMERGENCY MEDICINE

## 2023-11-14 RX ORDER — CLINDAMYCIN HYDROCHLORIDE 150 MG/1
150 CAPSULE ORAL ONCE
Status: COMPLETED | OUTPATIENT
Start: 2023-11-14 | End: 2023-11-14

## 2023-11-14 RX ORDER — CLINDAMYCIN HYDROCHLORIDE 150 MG/1
150 CAPSULE ORAL 3 TIMES DAILY
Qty: 30 CAPSULE | Refills: 0 | Status: SHIPPED | OUTPATIENT
Start: 2023-11-14 | End: 2023-11-24

## 2023-11-14 RX ORDER — CLINDAMYCIN HYDROCHLORIDE 150 MG/1
150 CAPSULE ORAL 3 TIMES DAILY
Qty: 30 CAPSULE | Refills: 0 | Status: SHIPPED | OUTPATIENT
Start: 2023-11-14 | End: 2023-11-14 | Stop reason: SDUPTHER

## 2023-11-14 RX ADMIN — CLINDAMYCIN HYDROCHLORIDE 150 MG: 150 CAPSULE ORAL at 00:45

## 2023-11-14 NOTE — ED PROVIDER NOTES
[address] [facility] [address] [address]      Pt Name: Yogi Bella  MRN: [id number]  [address] 8/31/1930  Date of evaluation: 11/13/2023  Provider: Davidson Canchola DO    CHIEF COMPLAINT  Chief Complaint   Patient presents with    Post-op Problem     Pt arrives to ED via EMS from [facility]. Per EMS, pt had 2 teeth pulled this am around 11. Pt is on Eliquis. Pt states that his gums stopped bleeding, but restarted after he drank water and took his medication around 1730. Pt is AAOx 4. This patient is at risk for a communicable infection. Therefore, personal protection equipment consisting of a mask was worn for the exam.    HPI  Yogi Bella is a 80 y.o. male who presents with pain from his tooth extraction at 6 AM.  He states it bled at first but it was controlled when he left the office. When he took his medicines and drank some water at 5:30 PM and started bleeding again. Therefore, he called 911 and they brought him to the emergency department. He is on 2 anticoagulants, Plavix and Eliquis. Chilango Garsia REVIEW OF SYSTEMS  All systems negative except as noted in the HPI. Reviewed Nurses' notes and concur. No LMP for male patient.     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 [facility]- Dr. 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

## 2023-11-14 NOTE — ED NOTES
Discharge and education instructions reviewed. Patient verbalized understanding, teach-back successful. Patient denied questions at this time. No acute distress noted. Patient instructed to follow-up as noted - return to emergency department if symptoms worsen. Patient verbalized understanding. Discharged per EDMD with discharge instructions.        Viviano Gitelman, RN  11/14/23 4202

## 2024-01-11 ENCOUNTER — PROCEDURE VISIT (OUTPATIENT)
Dept: SURGERY | Age: 89
End: 2024-01-11
Payer: OTHER GOVERNMENT

## 2024-01-11 ENCOUNTER — OFFICE VISIT (OUTPATIENT)
Dept: VASCULAR SURGERY | Age: 89
End: 2024-01-11
Payer: OTHER GOVERNMENT

## 2024-01-11 VITALS — SYSTOLIC BLOOD PRESSURE: 148 MMHG | DIASTOLIC BLOOD PRESSURE: 80 MMHG

## 2024-01-11 DIAGNOSIS — I65.23 BILATERAL CAROTID ARTERY STENOSIS WITHOUT CEREBRAL INFARCTION: Primary | ICD-10-CM

## 2024-01-11 DIAGNOSIS — E78.5 HYPERLIPIDEMIA, UNSPECIFIED HYPERLIPIDEMIA TYPE: ICD-10-CM

## 2024-01-11 DIAGNOSIS — I70.203 ATHEROSCLEROSIS OF NATIVE ARTERY OF BOTH LOWER EXTREMITIES, WITH UNSPECIFIED PRESENCE OF CLINICAL MANIFESTATION (HCC): ICD-10-CM

## 2024-01-11 DIAGNOSIS — I73.9 PAD (PERIPHERAL ARTERY DISEASE) (HCC): ICD-10-CM

## 2024-01-11 DIAGNOSIS — I65.23 CAROTID STENOSIS, BILATERAL: Primary | ICD-10-CM

## 2024-01-11 PROCEDURE — 99214 OFFICE O/P EST MOD 30 MIN: CPT | Performed by: NURSE PRACTITIONER

## 2024-01-11 PROCEDURE — 93880 EXTRACRANIAL BILAT STUDY: CPT | Performed by: SURGERY

## 2024-01-11 PROCEDURE — 1036F TOBACCO NON-USER: CPT | Performed by: NURSE PRACTITIONER

## 2024-01-11 PROCEDURE — 93925 LOWER EXTREMITY STUDY: CPT | Performed by: SURGERY

## 2024-01-11 PROCEDURE — G8427 DOCREV CUR MEDS BY ELIG CLIN: HCPCS | Performed by: NURSE PRACTITIONER

## 2024-01-11 PROCEDURE — G8484 FLU IMMUNIZE NO ADMIN: HCPCS | Performed by: NURSE PRACTITIONER

## 2024-01-11 PROCEDURE — G8420 CALC BMI NORM PARAMETERS: HCPCS | Performed by: NURSE PRACTITIONER

## 2024-01-11 PROCEDURE — 1123F ACP DISCUSS/DSCN MKR DOCD: CPT | Performed by: NURSE PRACTITIONER

## 2024-01-11 NOTE — PATIENT INSTRUCTIONS
Schedule to return in one year for a bilateral carotid duplex, bilateral lower extremity arterial duplex scan, ABIs and office visit.    PATIENT EDUCATION focused on signs/symptoms of stroke:  - Watch for one eye going dark like a shade was pulled down.  - Watch for one side of the body or face not functioning correctly.  - Difficulty with speech, such as slurring your words.  - Difficulty finding the right words, such as calling an object by the wrong name when you know the real name.       If you have any of these symptoms, do not call us or your family doctor.  Call 911 and go immediately to the hospital.  You have a 4-6 hour window from the point when symptoms start to get to the hospital, get a CT scan done and initiate clot dissolving drugs.

## 2024-01-12 NOTE — PROGRESS NOTES
Subjective:      Patient ID: Dallas Dominguez is a 89 y.o. male.  Chief Complaint   Patient presents with    Carotid Disease     Carotid duplex scan, arterial duplex scan, ABIs and office visit     Pain Assessment  The patient is currently not experiencing any pain at this time.    HPI     The patient is a 89 y.o. male who presents with a complaint of carotid stenosis follow up.  The patient has been previously diagnosed with Left carotid artery stenosis and Right carotid artery stenosis.  Date last scanned was 08/05/2019   Patient denies numbness/weakness on any one side, speech disturbances or visual disturbances.  Since last visit patient has had left CDS to be reviewed today and right CDS to be reviewed today.  The patient has not previously undergone surgical intervention for this problem.      Dallas is a 89 y.o. male who presents with a complaint of intermittent claudication.  The problem is located in the bilateral LE's, L>R.  Date last seen was 02/06/20.  The symptoms first began year(s) ago.  Since last visit, the patient has not experienced any new symptoms.  He is currently a resident at [facility] and is not walking as much as he used to.  Since last visit the patient has had an arterial scan and MONA's (today).  The patient has a patent right proximal popliteal to distal PT insitu bypass graft.        Review of Systems   HENT:  Positive for hearing loss (bilateral hearing aids).    Eyes:  Negative for visual disturbance.   Cardiovascular:         Left leg pain with walking extended distances   Genitourinary:         Hx of Right kidney cancer   Musculoskeletal:  Positive for gait problem (use of cane or walker).   Neurological:  Negative for speech difficulty, weakness and numbness.   All other systems reviewed and are negative.      Allergies   Allergen Reactions    Latex Itching    Penicillins      Chest pain       Prior to Visit Medications    Medication Sig Taking? Authorizing Provider

## 2024-03-28 ENCOUNTER — TELEPHONE (OUTPATIENT)
Dept: VASCULAR SURGERY | Age: 89
End: 2024-03-28

## 2024-03-28 NOTE — TELEPHONE ENCOUNTER
Pt's daughter, Kady, called regarding pt. She asked to speak with someone in Richard's office regarding billing. I mentioned that we do have a billing department that I could get her in contact with, but Kady stated that it is regarding an authorization for the pt. Kady requested a call back regarding this matter at [phone].        Please advise.

## 2024-03-28 NOTE — TELEPHONE ENCOUNTER
I spoke with Mr. Dominguez's daughter.  She said his scan bills were denied by two insurance companies and she said we needed a PA? Referral? She wasn't sure. I gave her the number for billing to see if they can help her

## 2024-10-17 NOTE — PRE-PROCEDURE INSTRUCTIONS
[address] [facility] [address]  [phone]        Pre-Op Phone Call:     Patient Name: Dallas Dominguez     Telephone Information:   Mobile [phone]     Home phone:  [phone]    Surgery Time:   1200     Arrival Time:  1045     Left extended Message:  Yes     Message left with: VM left for both numbers listed     Recent change in health status:  No      Instructed to bring eye drops, photo identification, and insurance card day of surgery?  Yes     Advised to wear short sleeved button down shirt (no T-shirt underneath):  Yes     Remarks:        Electronically signed by:  Trini Rojas RN at 10/17/2024 11:06 AM

## 2024-10-21 ENCOUNTER — PREP FOR PROCEDURE (OUTPATIENT)
Dept: OPHTHALMOLOGY | Age: 88
End: 2024-10-21

## 2024-10-21 ENCOUNTER — HOSPITAL ENCOUNTER (OUTPATIENT)
Dept: SURGERY | Age: 89
Setting detail: OUTPATIENT SURGERY
Discharge: HOME | End: 2024-10-21
Payer: MEDICARE

## 2024-10-21 VITALS — DIASTOLIC BLOOD PRESSURE: 66 MMHG | SYSTOLIC BLOOD PRESSURE: 121 MMHG

## 2024-10-21 PROCEDURE — 66821 AFTER CATARACT LASER SURGERY: CPT

## 2024-10-21 PROCEDURE — 2500000003 HC RX 250 WO HCPCS: Performed by: OPHTHALMOLOGY

## 2024-10-21 PROCEDURE — 6370000000 HC RX 637 (ALT 250 FOR IP): Performed by: OPHTHALMOLOGY

## 2024-10-21 RX ORDER — PHENYLEPHRINE HYDROCHLORIDE 25 MG/ML
1 SOLUTION/ DROPS OPHTHALMIC ONCE
Status: CANCELLED | OUTPATIENT
Start: 2024-10-21 | End: 2024-10-21

## 2024-10-21 RX ORDER — PHENYLEPHRINE HYDROCHLORIDE 25 MG/ML
1 SOLUTION/ DROPS OPHTHALMIC ONCE
Status: COMPLETED | OUTPATIENT
Start: 2024-10-21 | End: 2024-10-21

## 2024-10-21 RX ORDER — PROPARACAINE HYDROCHLORIDE 5 MG/ML
1 SOLUTION/ DROPS OPHTHALMIC ONCE
Status: CANCELLED | OUTPATIENT
Start: 2024-10-21 | End: 2024-10-21

## 2024-10-21 RX ORDER — PROPARACAINE HYDROCHLORIDE 5 MG/ML
1 SOLUTION/ DROPS OPHTHALMIC ONCE
Status: COMPLETED | OUTPATIENT
Start: 2024-10-21 | End: 2024-10-21

## 2024-10-21 RX ORDER — TROPICAMIDE 10 MG/ML
1 SOLUTION/ DROPS OPHTHALMIC ONCE
Status: COMPLETED | OUTPATIENT
Start: 2024-10-21 | End: 2024-10-21

## 2024-10-21 RX ORDER — TROPICAMIDE 10 MG/ML
1 SOLUTION/ DROPS OPHTHALMIC ONCE
Status: CANCELLED | OUTPATIENT
Start: 2024-10-21 | End: 2024-10-21

## 2024-10-21 RX ADMIN — PHENYLEPHRINE HYDROCHLORIDE 1 DROP: 25 SOLUTION/ DROPS OPHTHALMIC at 10:56

## 2024-10-21 RX ADMIN — TROPICAMIDE 1 DROP: 10 SOLUTION/ DROPS OPHTHALMIC at 10:57

## 2024-10-21 RX ADMIN — PROPARACAINE HYDROCHLORIDE 1 DROP: 5 SOLUTION/ DROPS OPHTHALMIC at 10:55

## 2024-10-21 ASSESSMENT — PAIN - FUNCTIONAL ASSESSMENT
PAIN_FUNCTIONAL_ASSESSMENT: 0-10
PAIN_FUNCTIONAL_ASSESSMENT: 0-10

## 2024-10-21 NOTE — DISCHARGE INSTRUCTIONS
[facility]   [address] [place] [address], [place] [address]   [phone]         Post-Operative Instructions for Laser Surgery      10/21/2024    Patient Name: Dallas Dominguez  : 1930    Tylenol is usually sufficient for any discomfort that you may feel.    It is not uncommon for you to experience the sensation of a large “floater” in your vision. If you experience hundreds of thousands of floaters that do not stop, flashing or arcing lights that do not stop, or a curtain or veil of blackness that you cannot see through, that does not go away, please call the office [phone].  Follow up as planned.

## 2024-10-21 NOTE — PROGRESS NOTES
Patient: Dallas Dominguez  8/31/1930, 89 y.o./male    Ambulatory to laser room.  Right eye marked and numbed by Dr. Polk.  Laser procedure done and tolerated well by patient.  Post  instructions given to Dallas by provider.      Power setting was 2.1 mJ        # of shots was 52    Total power was 0.11 mJ    Trini Rojas RN

## 2024-10-21 NOTE — OP NOTE
[facility]  [address] [facility]  [address]   [place] [address]  [phone]      10/21/2024    Patient name: Dallas Dominguez  YOB: 1930  MRN: [id number]    Allergies:   Allergies   Allergen Reactions    Latex Itching    Penicillins      Chest pain       Pre-operative diagnosis:  After cataract obscuring vision of the RIGHT eye.    Post-operative diagnosis:  Same    Procedure Performed:  YAG Capsulotomy of the RIGHT eye    Anesthesia:  None    Estimated Blood Loss: None    Specimens removed: None    Complications:  None    Description of Procedure: Pt appears to be oriented to time, place, and person. Pre-op medications instilled in the RIGHT eye: Proparacaine 1 drop, Christiano-Synephrine 2.5% 1 drop topical and Mydriacyl 0.5% 1 drop topical. A Yag Capsulotomy was then performed on the RIGHT eye. Patient is comfortable and will be released to self.     Electronically signed by: Mark Polk MD,10/21/2024,5:59 PM

## (undated) DEVICE — SOLUTION IV IRRIG WATER 1000ML POUR BRL 2F7114

## (undated) DEVICE — GLOVE SURG SZ 65 L12IN FNGR THK87MIL WHT LTX FREE

## (undated) DEVICE — TUR/ENDOSCOPIC CABLE, 10' (3.05 M): Brand: CONMED

## (undated) DEVICE — ELECTRODE PT RET AD L9FT HI MOIST COND ADH HYDRGEL CORDED

## (undated) DEVICE — Y-TYPE TUR/BLADDER IRRIGATION SET, REGULATING CLAMP

## (undated) DEVICE — Z DISCONTINUED BY MEDLINE USE 2711682 TRAY SKIN PREP DRY W/ PREM GLV

## (undated) DEVICE — CABLE ENDOSCP L10FT ACT DISP

## (undated) DEVICE — SYRINGE MED 10ML LUERLOCK TIP W/O SFTY DISP

## (undated) DEVICE — TOWEL,OR,DSP,ST,BLUE,STD,4/PK,20PK/CS: Brand: MEDLINE

## (undated) DEVICE — BAG URO DRN URO-CATCHER

## (undated) DEVICE — Device

## (undated) DEVICE — DRAINBAG,ANTI-REFLUX TOWER,L/F,2000ML,LL: Brand: MEDLINE

## (undated) DEVICE — SKIN MARKER REGULAR TIP WITH RULER CAP AND LABELS: Brand: DEVON

## (undated) DEVICE — PACK,CYSTOSCOPY,PK III,AURORA: Brand: MEDLINE

## (undated) DEVICE — X-RAY DETECTABLE SPONGES,16 PLY: Brand: VISTEC

## (undated) DEVICE — Z INACTIVE USE 2635503 SOLUTION IRRIG 3000ML ST H2O USP UROMATIC PLAS CONT